# Patient Record
Sex: MALE | Race: WHITE | NOT HISPANIC OR LATINO | Employment: OTHER | URBAN - METROPOLITAN AREA
[De-identification: names, ages, dates, MRNs, and addresses within clinical notes are randomized per-mention and may not be internally consistent; named-entity substitution may affect disease eponyms.]

---

## 2023-04-25 DIAGNOSIS — F32.1 EPISODE OF MODERATE MAJOR DEPRESSION (MULTI): ICD-10-CM

## 2023-04-25 RX ORDER — ESCITALOPRAM OXALATE 20 MG/1
20 TABLET ORAL
COMMUNITY
Start: 2018-06-25 | End: 2023-04-25 | Stop reason: SDUPTHER

## 2023-04-25 RX ORDER — ESCITALOPRAM OXALATE 20 MG/1
TABLET ORAL
Qty: 135 TABLET | Refills: 1 | Status: SHIPPED | OUTPATIENT
Start: 2023-04-25 | End: 2023-10-17

## 2023-07-21 ENCOUNTER — TELEPHONE (OUTPATIENT)
Dept: PRIMARY CARE | Facility: CLINIC | Age: 88
End: 2023-07-21
Payer: MEDICARE

## 2023-07-21 DIAGNOSIS — R25.1 TREMORS OF NERVOUS SYSTEM: Primary | ICD-10-CM

## 2023-12-01 PROBLEM — F32.1 EPISODE OF MODERATE MAJOR DEPRESSION (MULTI): Status: ACTIVE | Noted: 2023-12-01

## 2023-12-01 PROBLEM — G25.0 ESSENTIAL TREMOR: Status: ACTIVE | Noted: 2023-12-01

## 2023-12-01 PROBLEM — E55.9 VITAMIN D DEFICIENCY: Status: ACTIVE | Noted: 2023-12-01

## 2023-12-01 PROBLEM — E53.8 VITAMIN B12 DEFICIENCY: Status: ACTIVE | Noted: 2023-12-01

## 2023-12-01 PROBLEM — H90.3 SENSORINEURAL HEARING LOSS (SNHL) OF BOTH EARS: Status: ACTIVE | Noted: 2023-12-01

## 2023-12-01 PROBLEM — D18.01 HEMANGIOMA OF SKIN AND SUBCUTANEOUS TISSUE: Status: ACTIVE | Noted: 2021-01-05

## 2023-12-01 PROBLEM — B02.23 POST-HERPETIC POLYNEUROPATHY: Status: ACTIVE | Noted: 2023-12-01

## 2023-12-01 PROBLEM — E78.2 COMBINED HYPERLIPIDEMIA: Status: ACTIVE | Noted: 2023-12-01

## 2023-12-01 PROBLEM — G47.33 OBSTRUCTIVE SLEEP APNEA: Status: ACTIVE | Noted: 2023-12-01

## 2023-12-01 PROBLEM — M47.816 ARTHRITIS OF LUMBAR SPINE: Status: ACTIVE | Noted: 2023-12-01

## 2023-12-01 PROBLEM — I45.10 RIGHT BUNDLE BRANCH BLOCK (RBBB) ON ELECTROCARDIOGRAM (ECG): Status: ACTIVE | Noted: 2023-12-01

## 2023-12-01 PROBLEM — E78.5 DYSLIPIDEMIA: Status: ACTIVE | Noted: 2023-12-01

## 2023-12-01 PROBLEM — K21.9 GERD WITHOUT ESOPHAGITIS: Status: ACTIVE | Noted: 2023-12-01

## 2023-12-01 PROBLEM — I10 PRIMARY HYPERTENSION: Status: ACTIVE | Noted: 2023-12-01

## 2023-12-01 PROBLEM — I65.23 BILATERAL CAROTID ARTERY OCCLUSION: Status: ACTIVE | Noted: 2023-12-01

## 2023-12-01 PROBLEM — I87.2 CHRONIC VENOUS INSUFFICIENCY: Status: ACTIVE | Noted: 2021-01-05

## 2023-12-01 PROBLEM — Z95.0 PACEMAKER: Status: ACTIVE | Noted: 2023-12-01

## 2023-12-01 PROBLEM — D22.5 MELANOCYTIC NEVI OF TRUNK: Status: ACTIVE | Noted: 2021-01-05

## 2023-12-01 PROBLEM — I34.0 NONRHEUMATIC MITRAL VALVE REGURGITATION: Status: ACTIVE | Noted: 2023-12-01

## 2023-12-05 ENCOUNTER — OFFICE VISIT (OUTPATIENT)
Dept: NEUROLOGY | Facility: CLINIC | Age: 88
End: 2023-12-05
Payer: MEDICARE

## 2023-12-05 VITALS
BODY MASS INDEX: 32.78 KG/M2 | HEART RATE: 70 BPM | DIASTOLIC BLOOD PRESSURE: 80 MMHG | WEIGHT: 229 LBS | SYSTOLIC BLOOD PRESSURE: 120 MMHG | HEIGHT: 70 IN

## 2023-12-05 DIAGNOSIS — G25.0 ESSENTIAL TREMOR: ICD-10-CM

## 2023-12-05 DIAGNOSIS — R42 DIZZINESS: Primary | ICD-10-CM

## 2023-12-05 PROCEDURE — 1036F TOBACCO NON-USER: CPT | Performed by: PSYCHIATRY & NEUROLOGY

## 2023-12-05 PROCEDURE — 3079F DIAST BP 80-89 MM HG: CPT | Performed by: PSYCHIATRY & NEUROLOGY

## 2023-12-05 PROCEDURE — 3074F SYST BP LT 130 MM HG: CPT | Performed by: PSYCHIATRY & NEUROLOGY

## 2023-12-05 PROCEDURE — 1159F MED LIST DOCD IN RCRD: CPT | Performed by: PSYCHIATRY & NEUROLOGY

## 2023-12-05 PROCEDURE — 99204 OFFICE O/P NEW MOD 45 MIN: CPT | Performed by: PSYCHIATRY & NEUROLOGY

## 2023-12-05 RX ORDER — VITAMIN B COMPLEX
1 TABLET ORAL DAILY
COMMUNITY

## 2023-12-05 RX ORDER — ROSUVASTATIN CALCIUM 10 MG/1
10 TABLET, COATED ORAL DAILY
COMMUNITY

## 2023-12-05 RX ORDER — METOPROLOL SUCCINATE 25 MG/1
12.5 TABLET, EXTENDED RELEASE ORAL DAILY
COMMUNITY
End: 2024-01-12 | Stop reason: ALTCHOICE

## 2023-12-05 RX ORDER — ACETAMINOPHEN 500 MG
2000 TABLET ORAL DAILY
COMMUNITY
Start: 2022-01-27

## 2023-12-05 RX ORDER — B-COMPLEX WITH VITAMIN C
1 TABLET ORAL DAILY
COMMUNITY

## 2023-12-05 RX ORDER — PRIMIDONE 50 MG/1
25 TABLET ORAL NIGHTLY
Qty: 15 TABLET | Refills: 11 | Status: SHIPPED | OUTPATIENT
Start: 2023-12-05 | End: 2024-02-12 | Stop reason: SDUPTHER

## 2023-12-05 RX ORDER — LOPERAMIDE HCL 2 MG
2 TABLET ORAL AS NEEDED
COMMUNITY

## 2023-12-05 RX ORDER — MV-MIN/FOLIC/K1/LYCOPEN/LUTEIN 300-60 MCG
TABLET ORAL DAILY
COMMUNITY
Start: 2022-01-27

## 2023-12-05 RX ORDER — ACETAMINOPHEN 500 MG
500 TABLET ORAL EVERY 6 HOURS PRN
COMMUNITY

## 2023-12-05 RX ORDER — MECLIZINE HYDROCHLORIDE 25 MG/1
25 TABLET ORAL AS NEEDED
COMMUNITY
Start: 2018-01-16 | End: 2024-01-12 | Stop reason: ALTCHOICE

## 2023-12-05 ASSESSMENT — ENCOUNTER SYMPTOMS
DIZZINESS: 1
TREMORS: 1

## 2023-12-05 ASSESSMENT — VISUAL ACUITY: VA_NORMAL: 1

## 2023-12-05 NOTE — PROGRESS NOTES
I saw and evaluated the patient. I personally obtained the key and critical portions of the history and physical exam or was physically present for key and critical portions performed by the resident/fellow. I reviewed the resident/fellow's documentation and discussed the patient with the resident/fellow. I agree with the resident/fellow's medical decision making as documented in the note.    Hi Schwarz MD

## 2024-01-04 ENCOUNTER — TELEPHONE (OUTPATIENT)
Dept: NEUROLOGY | Facility: CLINIC | Age: 89
End: 2024-01-04
Payer: MEDICARE

## 2024-01-04 ENCOUNTER — APPOINTMENT (OUTPATIENT)
Dept: RADIOLOGY | Facility: HOSPITAL | Age: 89
End: 2024-01-04
Payer: MEDICARE

## 2024-01-04 DIAGNOSIS — Z95.9 CARDIAC DEVICE IN SITU: Primary | ICD-10-CM

## 2024-01-11 NOTE — PROGRESS NOTES
"Subjective   Reason for Visit: Garrison Rodriguez is an 89 y.o. male here for a Medicare Wellness visit.     Past Medical, Surgical, and Family History reviewed and updated in chart.    Reviewed all medications by prescribing practitioner or clinical pharmacist (such as prescriptions, OTCs, herbal therapies and supplements) and documented in the medical record.    HPI  Garrison \"Jean Claude\" is an 88 yo male presenting today for AWV     Pt is accompanied by his daughter, Hetal      Pt has hearing loss, wears B/L hearing aids    Pt saw neurology, Dr. Schwarz  Discussed his dizziness, was Rx Primidone 25 mg daily  Stopped metoprolol (okay per cardiology)  Is going to have an MRI of neck, waiting for clearance d/t having pacemaker, had chest xray today      #AFIB/PACEMAKER  Dx'd 9/2021  Cardioversion 2021  Pacemaker 2021  Cardiology: Dr. Aguirre  Rx  Eliquis  BP today: 124/78    #DEPRESSION  Dx'd 2018 (death of spouse)  Rx Lexapro 30 mg daily  mood: no concerns  sleep: no concerns     #GIORGI  sleep study 2019  pt attempted to wear CPAP, but unable to tolerate   states \"I don't want to wear it\"  Family supports not using device     Patient Care Team:  NICOLE Fernandez-CNP as PCP - General (Family Medicine)  John Moritz, MD as PCP - [employer] PCP       Review of Systems  All 13 systems were reviewed and are within normal limits except positive and pertinent negative responses which are noted below or in HPI.      Objective   Vitals:  /78   Pulse 69   Ht 1.778 m (5' 10\")   Wt 104 kg (230 lb)   SpO2 96%   BMI 33.00 kg/m²       Current Outpatient Medications   Medication Instructions    acetaminophen (TYLENOL) 500 mg, oral, Every 6 hours PRN    apixaban (ELIQUIS) 5 mg, oral, 2 times daily    b complex-vitamin c tablet 1 tablet, oral, Daily    cholecalciferol (VITAMIN D-3) 2,000 Units, oral, Daily    cyanocobalamin, vitamin B-12, (Vitamin B-12) 2,500 mcg tablet, sublingual SL tablet 1 tablet, oral, Daily    " escitalopram (Lexapro) 20 mg tablet TAKE 1 & 1/2 TABLETS BY MOUTH EVERY DAY    loperamide (IMODIUM A-D) 2 mg, oral, As needed    mv-min-folic-K1-lycopen-lutein (Centrum Silver Men) 642--300 mcg tablet oral, Daily    primidone (MYSOLINE) 25 mg, oral, Nightly    rosuvastatin (CRESTOR) 10 mg, oral, Daily         Physical Exam  Vitals reviewed.   Cardiovascular:      Rate and Rhythm: Normal rate.      Pulses: Normal pulses.      Heart sounds: Normal heart sounds.   Pulmonary:      Effort: Pulmonary effort is normal.      Breath sounds: Normal breath sounds.   Skin:     General: Skin is warm and dry.   Neurological:      Mental Status: He is alert.   Psychiatric:         Mood and Affect: Mood normal.         Assessment/Plan     Problem List Items Addressed This Visit             ICD-10-CM    Episode of moderate major depression (CMS/HCC) F32.1    Pacemaker Z95.0    Vitamin B12 deficiency E53.8    Relevant Orders    Vitamin B12    Vitamin D deficiency E55.9    Relevant Orders    Vitamin D 25-Hydroxy,Total (for eval of Vitamin D levels)    Primary hypertension I10    Relevant Orders    Comprehensive Metabolic Panel    Dyslipidemia E78.5    Relevant Orders    Lipid Panel     Other Visit Diagnoses         Codes    Routine general medical examination at health care facility    -  Primary Z00.00    Medicare annual wellness visit, subsequent     Z00.00

## 2024-01-12 ENCOUNTER — OFFICE VISIT (OUTPATIENT)
Dept: PRIMARY CARE | Facility: CLINIC | Age: 89
End: 2024-01-12
Payer: MEDICARE

## 2024-01-12 ENCOUNTER — ANCILLARY PROCEDURE (OUTPATIENT)
Dept: RADIOLOGY | Facility: CLINIC | Age: 89
End: 2024-01-12
Payer: MEDICARE

## 2024-01-12 VITALS
OXYGEN SATURATION: 96 % | SYSTOLIC BLOOD PRESSURE: 124 MMHG | HEIGHT: 70 IN | BODY MASS INDEX: 32.93 KG/M2 | HEART RATE: 69 BPM | DIASTOLIC BLOOD PRESSURE: 78 MMHG | WEIGHT: 230 LBS

## 2024-01-12 DIAGNOSIS — E53.8 VITAMIN B12 DEFICIENCY: ICD-10-CM

## 2024-01-12 DIAGNOSIS — E78.5 DYSLIPIDEMIA: ICD-10-CM

## 2024-01-12 DIAGNOSIS — F32.1 EPISODE OF MODERATE MAJOR DEPRESSION (MULTI): ICD-10-CM

## 2024-01-12 DIAGNOSIS — Z00.00 MEDICARE ANNUAL WELLNESS VISIT, SUBSEQUENT: Primary | ICD-10-CM

## 2024-01-12 DIAGNOSIS — E55.9 VITAMIN D DEFICIENCY: ICD-10-CM

## 2024-01-12 DIAGNOSIS — Z95.9 CARDIAC DEVICE IN SITU: ICD-10-CM

## 2024-01-12 DIAGNOSIS — Z00.00 ROUTINE GENERAL MEDICAL EXAMINATION AT HEALTH CARE FACILITY: ICD-10-CM

## 2024-01-12 DIAGNOSIS — I10 PRIMARY HYPERTENSION: ICD-10-CM

## 2024-01-12 DIAGNOSIS — Z95.0 PACEMAKER: ICD-10-CM

## 2024-01-12 PROCEDURE — 1159F MED LIST DOCD IN RCRD: CPT | Performed by: NURSE PRACTITIONER

## 2024-01-12 PROCEDURE — 1170F FXNL STATUS ASSESSED: CPT | Performed by: NURSE PRACTITIONER

## 2024-01-12 PROCEDURE — 71046 X-RAY EXAM CHEST 2 VIEWS: CPT | Performed by: RADIOLOGY

## 2024-01-12 PROCEDURE — 3074F SYST BP LT 130 MM HG: CPT | Performed by: NURSE PRACTITIONER

## 2024-01-12 PROCEDURE — G0439 PPPS, SUBSEQ VISIT: HCPCS | Performed by: NURSE PRACTITIONER

## 2024-01-12 PROCEDURE — 99212 OFFICE O/P EST SF 10 MIN: CPT | Performed by: NURSE PRACTITIONER

## 2024-01-12 PROCEDURE — 3078F DIAST BP <80 MM HG: CPT | Performed by: NURSE PRACTITIONER

## 2024-01-12 PROCEDURE — 71046 X-RAY EXAM CHEST 2 VIEWS: CPT

## 2024-01-12 PROCEDURE — 1036F TOBACCO NON-USER: CPT | Performed by: NURSE PRACTITIONER

## 2024-01-12 ASSESSMENT — PATIENT HEALTH QUESTIONNAIRE - PHQ9
SUM OF ALL RESPONSES TO PHQ9 QUESTIONS 1 AND 2: 0
2. FEELING DOWN, DEPRESSED OR HOPELESS: NOT AT ALL
1. LITTLE INTEREST OR PLEASURE IN DOING THINGS: NOT AT ALL

## 2024-01-12 ASSESSMENT — ACTIVITIES OF DAILY LIVING (ADL)
DRESSING: INDEPENDENT
DOING_HOUSEWORK: INDEPENDENT
TAKING_MEDICATION: NEEDS ASSISTANCE
GROCERY_SHOPPING: INDEPENDENT
MANAGING_FINANCES: INDEPENDENT
BATHING: INDEPENDENT

## 2024-01-12 ASSESSMENT — ENCOUNTER SYMPTOMS
LOSS OF SENSATION IN FEET: 0
DEPRESSION: 0
OCCASIONAL FEELINGS OF UNSTEADINESS: 1

## 2024-01-12 NOTE — PATIENT INSTRUCTIONS
Thank you for seeing me today.  It was a pleasure to see you again!    Today we did your Annual Medicare Wellness Exam and discussed the following:     Continue medications as prescribed    F/U with neurology as encouraged    F/U with cardiology as encouraged     Lab work ordered today.  Please have your blood drawn in the next 1-2 weeks.  You need to be FASTING for 12 hours prior to blood draw.  You may only have water.  Please contact your insurance company to ask about the best location to get blood drawn.  We will contact you with the results of your blood work and any necessary adjustments  to your plan of care, if you do not hear from us within 3-5 days of having your blood drawn, please call the office at [phone].      RTC JANUARY 2025 FOR AWV AND AS NEEDED

## 2024-01-18 ENCOUNTER — LAB (OUTPATIENT)
Dept: LAB | Facility: LAB | Age: 89
End: 2024-01-18
Payer: MEDICARE

## 2024-01-18 DIAGNOSIS — E55.9 VITAMIN D DEFICIENCY: ICD-10-CM

## 2024-01-18 DIAGNOSIS — I10 PRIMARY HYPERTENSION: ICD-10-CM

## 2024-01-18 DIAGNOSIS — E78.5 DYSLIPIDEMIA: ICD-10-CM

## 2024-01-18 DIAGNOSIS — E53.8 VITAMIN B12 DEFICIENCY: ICD-10-CM

## 2024-01-18 LAB
25(OH)D3 SERPL-MCNC: 65 NG/ML (ref 30–100)
ALBUMIN SERPL BCP-MCNC: 4 G/DL (ref 3.4–5)
ALP SERPL-CCNC: 78 U/L (ref 33–136)
ALT SERPL W P-5'-P-CCNC: 16 U/L (ref 10–52)
ANION GAP SERPL CALC-SCNC: 14 MMOL/L (ref 10–20)
AST SERPL W P-5'-P-CCNC: 26 U/L (ref 9–39)
BILIRUB SERPL-MCNC: 0.6 MG/DL (ref 0–1.2)
BUN SERPL-MCNC: 25 MG/DL (ref 6–23)
CALCIUM SERPL-MCNC: 9.3 MG/DL (ref 8.6–10.3)
CHLORIDE SERPL-SCNC: 102 MMOL/L (ref 98–107)
CHOLEST SERPL-MCNC: 144 MG/DL (ref 0–199)
CHOLESTEROL/HDL RATIO: 2.6
CO2 SERPL-SCNC: 27 MMOL/L (ref 21–32)
CREAT SERPL-MCNC: 1.23 MG/DL (ref 0.5–1.3)
EGFRCR SERPLBLD CKD-EPI 2021: 56 ML/MIN/1.73M*2
GLUCOSE SERPL-MCNC: 83 MG/DL (ref 74–99)
HDLC SERPL-MCNC: 56.3 MG/DL
LDLC SERPL CALC-MCNC: 75 MG/DL
NON HDL CHOLESTEROL: 88 MG/DL (ref 0–149)
POTASSIUM SERPL-SCNC: 4.5 MMOL/L (ref 3.5–5.3)
PROT SERPL-MCNC: 6.9 G/DL (ref 6.4–8.2)
SODIUM SERPL-SCNC: 138 MMOL/L (ref 136–145)
TRIGL SERPL-MCNC: 62 MG/DL (ref 0–149)
VIT B12 SERPL-MCNC: 571 PG/ML (ref 211–911)
VLDL: 12 MG/DL (ref 0–40)

## 2024-01-18 PROCEDURE — 82306 VITAMIN D 25 HYDROXY: CPT

## 2024-01-18 PROCEDURE — 36415 COLL VENOUS BLD VENIPUNCTURE: CPT

## 2024-01-18 PROCEDURE — 82607 VITAMIN B-12: CPT

## 2024-01-18 PROCEDURE — 80061 LIPID PANEL: CPT

## 2024-01-18 PROCEDURE — 80053 COMPREHEN METABOLIC PANEL: CPT

## 2024-01-31 ENCOUNTER — HOSPITAL ENCOUNTER (OUTPATIENT)
Dept: RADIOLOGY | Facility: HOSPITAL | Age: 89
Discharge: HOME | End: 2024-01-31
Payer: MEDICARE

## 2024-01-31 VITALS — HEART RATE: 80 BPM | OXYGEN SATURATION: 94 %

## 2024-01-31 DIAGNOSIS — R42 DIZZINESS: ICD-10-CM

## 2024-01-31 PROCEDURE — 72141 MRI NECK SPINE W/O DYE: CPT | Performed by: RADIOLOGY

## 2024-01-31 PROCEDURE — 72141 MRI NECK SPINE W/O DYE: CPT

## 2024-02-04 DIAGNOSIS — F32.1 EPISODE OF MODERATE MAJOR DEPRESSION (MULTI): ICD-10-CM

## 2024-02-05 RX ORDER — ESCITALOPRAM OXALATE 20 MG/1
TABLET ORAL
Qty: 135 TABLET | Refills: 3 | Status: SHIPPED | OUTPATIENT
Start: 2024-02-05

## 2024-02-12 ENCOUNTER — OFFICE VISIT (OUTPATIENT)
Dept: NEUROLOGY | Facility: CLINIC | Age: 89
End: 2024-02-12
Payer: MEDICARE

## 2024-02-12 VITALS
DIASTOLIC BLOOD PRESSURE: 84 MMHG | HEART RATE: 64 BPM | HEIGHT: 70 IN | WEIGHT: 230 LBS | SYSTOLIC BLOOD PRESSURE: 126 MMHG | BODY MASS INDEX: 32.93 KG/M2

## 2024-02-12 DIAGNOSIS — G25.0 ESSENTIAL TREMOR: ICD-10-CM

## 2024-02-12 PROCEDURE — 1157F ADVNC CARE PLAN IN RCRD: CPT | Performed by: PSYCHIATRY & NEUROLOGY

## 2024-02-12 PROCEDURE — 1160F RVW MEDS BY RX/DR IN RCRD: CPT | Performed by: PSYCHIATRY & NEUROLOGY

## 2024-02-12 PROCEDURE — 1123F ACP DISCUSS/DSCN MKR DOCD: CPT | Performed by: PSYCHIATRY & NEUROLOGY

## 2024-02-12 PROCEDURE — 3079F DIAST BP 80-89 MM HG: CPT | Performed by: PSYCHIATRY & NEUROLOGY

## 2024-02-12 PROCEDURE — 99214 OFFICE O/P EST MOD 30 MIN: CPT | Performed by: PSYCHIATRY & NEUROLOGY

## 2024-02-12 PROCEDURE — 3074F SYST BP LT 130 MM HG: CPT | Performed by: PSYCHIATRY & NEUROLOGY

## 2024-02-12 PROCEDURE — 1036F TOBACCO NON-USER: CPT | Performed by: PSYCHIATRY & NEUROLOGY

## 2024-02-12 PROCEDURE — 1159F MED LIST DOCD IN RCRD: CPT | Performed by: PSYCHIATRY & NEUROLOGY

## 2024-02-12 RX ORDER — PRIMIDONE 50 MG/1
50 TABLET ORAL NIGHTLY
Qty: 90 TABLET | Refills: 3 | Status: SHIPPED | OUTPATIENT
Start: 2024-02-12 | End: 2025-02-11

## 2024-02-12 ASSESSMENT — ENCOUNTER SYMPTOMS
TREMORS: 1
COUGH: 0
AGITATION: 0
SHORTNESS OF BREATH: 0
BACK PAIN: 0
POLYDIPSIA: 0
ARTHRALGIAS: 1
WOUND: 0
CONSTIPATION: 0
DIFFICULTY URINATING: 0
FATIGUE: 0
CHILLS: 0
DIARRHEA: 0
EYE DISCHARGE: 0
DIAPHORESIS: 0
FEVER: 0
ADENOPATHY: 0

## 2024-02-12 ASSESSMENT — VISUAL ACUITY: VA_NORMAL: 1

## 2024-02-12 NOTE — PROGRESS NOTES
"Follow up : Essential tremors    History Of Present Illness  Garrison Rodriguez \"Porfirio" is a 89 y.o. male presenting with Essential tremors. He is on half a pill and doing well and no side effects of the primidone. He still driving and left side hand tremors are worse than the right.  MRI C images shows and and discussed. See report below.    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    Post-herpetic polyneuropathy    Spondylosis, unspecified 10/12/2016    Degenerative arthritis of spine          Past Surgical History:   Procedure Laterality Date    BREAST LUMPECTOMY  07/15/2016    Left Breast Lumpectomy    OTHER SURGICAL HISTORY  05/18/2022    Gallbladder surgery    OTHER SURGICAL HISTORY  05/18/2022    Heart surgery    OTHER SURGICAL HISTORY  05/18/2022    Back surgery    OTHER SURGICAL HISTORY  04/15/2020    Cholecystectomy with cholangiography    OTHER SURGICAL HISTORY  04/15/2020    Esophagogastroduodenoscopy    TONSILLECTOMY  06/25/2018    Tonsillectomy    TOTAL KNEE ARTHROPLASTY  01/04/2023    Total Knee Arthroplasty        Social History  Social History     Tobacco Use    Smoking status: Never     Passive exposure: Never    Smokeless tobacco: Never   Vaping Use    Vaping Use: Never used   Substance Use Topics    Alcohol use: Never    Drug use: Never     Allergies  Patient has no known allergies.  (Not in a hospital admission)      Review of Systems   Constitutional:  Negative for chills, diaphoresis, fatigue and fever.   HENT:  Negative for congestion.    Eyes:  Negative for discharge.   Respiratory:  Negative " for cough and shortness of breath.    Cardiovascular:  Negative for chest pain.   Gastrointestinal:  Negative for constipation and diarrhea.   Endocrine: Negative for polydipsia.   Genitourinary:  Negative for difficulty urinating.   Musculoskeletal:  Positive for arthralgias. Negative for back pain.        S/p Knee surgery, bilateral   Skin:  Negative for pallor and wound.   Allergic/Immunologic: Negative for environmental allergies.   Neurological:  Positive for tremors.   Hematological:  Negative for adenopathy.   Psychiatric/Behavioral:  Negative for agitation and behavioral problems.          Cardiovascular system: S1-S2 intact  Respiratory clear to auscultation bilateral on deep breathing he feels irritability on the left side of the chest.    Neurological Exam  Mental Status  Awake, alert and oriented to person, place and time. Recent and remote memory are intact. Able to copy figure. Clock drawing is normal. Speech is normal. Able to name objects, name parts of objects, repeat, read and write. Follows three-step commands. Able to perform serial calculations. Able to spell words backwards. Fund of knowledge is appropriate for level of education.    Cranial Nerves  CN II: Visual acuity is normal. Right funduscopic exam: disc intact. Left funduscopic exam: disc intact.  CN III, IV, VI: Extraocular movements intact bilaterally. Normal lids and orbits bilaterally.   Right pupil: Round. Reactive to light. Reactive to accommodation.  CN V:  Right: Facial sensation is normal.  Left: Facial sensation is normal on the left.  CN VII: Full and symmetric facial movement.  CN VIII:  Right: Hearing is normal.  Left: Hearing is normal.  CN IX, X:  Right: Palate is normal.  Left: Palate is normal.  CN XI:  Right: Sternocleidomastoid strength is normal.  Left: Sternocleidomastoid strength is normal.  CN XII: Tongue midline without atrophy or fasciculations.    Motor  Normal muscle bulk throughout. No fasciculations present.  "Normal muscle tone. No abnormal involuntary movements. Strength is 5/5 throughout all four extremities.    Sensory  Light touch is normal in upper and lower extremities. Pinprick is normal in upper and lower extremities. Temperature is normal in upper and lower extremities. Vibration is normal in upper and lower extremities.     Reflexes  Deep tendon reflexes are 2+ and symmetric except as noted.                                            Right                      Left  Hamstring                            0                         0  Achilles                                0                         0  Jaw jerk absent.  Right pathological reflexes: Rusty's absent.  Left pathological reflexes: Rusty's absent.  Glabellar tap absent. Snout absent. Right palmomental absent. Left palmomental absent. Right palmar grasp absent. Left palmar grasp absent.    Coordination  Right: Finger-to-nose normal. Rapid alternating movement normal. Heel-to-shin normal.Left: Finger-to-nose normal. Rapid alternating movement normal. Heel-to-shin normal.    Gait  Normal casual, toe, heel and tandem gait.        Last Recorded Vitals  Blood pressure 126/84, pulse 64, height 1.778 m (5' 10\"), weight 104 kg (230 lb).    Relevant Results      Current Outpatient Medications:     acetaminophen (Tylenol) 500 mg tablet, Take 1 tablet (500 mg) by mouth every 6 hours if needed., Disp: , Rfl:     apixaban (Eliquis) 5 mg tablet, Take 1 tablet (5 mg) by mouth 2 times a day., Disp: , Rfl:     b complex-vitamin c tablet, Take 1 tablet by mouth once daily., Disp: , Rfl:     cholecalciferol (Vitamin D-3) 50 mcg (2,000 unit) capsule, Take 1 capsule (50 mcg) by mouth early in the morning.., Disp: , Rfl:     cyanocobalamin, vitamin B-12, (Vitamin B-12) 2,500 mcg tablet, sublingual SL tablet, Take 1 tablet (2,500 mcg) by mouth once daily., Disp: , Rfl:     escitalopram (Lexapro) 20 mg tablet, TAKE 1 & 1/2 TABLETS BY MOUTH EVERY DAY, Disp: 135 tablet, Rfl: " 3    loperamide (Imodium A-D) 2 mg tablet, Take 1 tablet (2 mg) by mouth if needed., Disp: , Rfl:     mv-min-folic-K1-lycopen-lutein (Centrum Silver Men) 252--300 mcg tablet, Take by mouth once daily., Disp: , Rfl:     rosuvastatin (Crestor) 10 mg tablet, Take 1 tablet (10 mg) by mouth once daily., Disp: , Rfl:     primidone (Mysoline) 50 mg tablet, Take 1 tablet (50 mg) by mouth once daily at bedtime., Disp: 90 tablet, Rfl: 3     Continuous medications    PRN medications, reviewed                                        I have personally reviewed the following imaging for brain (CT/ MR) and results and discussed in detail with the patient/care giver/family     MR cervical spine wo IV contrast    Result Date: 1/31/2024  Interpreted By:  Roc Bourne, STUDY: MR CERVICAL SPINE WO IV CONTRAST;  1/31/2024 3:00 pm   INDICATION: Signs/Symptoms:dizziness, limited cervical movement.   COMPARISON: None.   ACCESSION NUMBER(S): [id number]   ORDERING CLINICIAN: CHERIE MICEHLLE   TECHNIQUE: Sagittal STIR, sagittal T2, sagittal T1, axial T2, axial T1 weighted MRI images of the cervical spine were obtained.   FINDINGS: There is exaggeration of the lordotic curvature of the cervical spine which may be related to patient positioning.   There is 1-2 mm of anterolisthesis of C4 on C5.   No focal bone marrow signal abnormality is noted.   The visualized spinal cord demonstrates no signal abnormality within it.   At the C2/3 level,  there is a mild posterior disc/osteophyte complex and ligamentum flavum hypertrophy contributing to mild spinal canal narrowing without significant spinal cord deformity. There are degenerative facet changes left greater than right contributing to moderate left-sided neural foraminal narrowing there is no significant right-sided neural foraminal narrowing.   At the C3/4 level,  there is a mild posterior disc osteophyte complex and ligamentum flavum hypertrophy contributing to mild spinal canal  narrowing without spinal cord deformity. There are hypertrophic degenerative facet changes and degenerative uncovertebral joint changes left greater than right contributing to severe left and mild to moderate right-sided neural foraminal narrowing.   At the C4/5 level,  there is 1-2 mm of anterolisthesis of C4 on C5 as well as a mild posterior disc/osteophyte complex contributing to mild encroachment upon the spinal canal without spinal cord deformity. There are hypertrophic degenerative facet changes left greater than right there is moderate encroachment upon the neural foramen bilaterally.   At the C5/6 level,  there is a posterior disc/osteophyte complex and ligamentum flavum hypertrophy contributing to mild spinal canal narrowing without spinal cord deformity. There are degenerative uncovertebral joint changes and degenerative facet changes contributing to moderate encroachment upon the neural foramen bilaterally.   At the C6/7 level,  there is a posterior disc/osteophyte complex contributing to effacement of the ventral subarachnoid space without significant spinal cord deformity. There is mild overall spinal canal narrowing. There are degenerative uncovertebral joint changes and degenerative facet changes contributing to moderate right and mild left-sided neural foraminal narrowing.   At the C7/T1 level,  there is a mild posterior disc/osteophyte complex more pronounced to the left of midline along with mild ligamentum flavum hypertrophy contributing to mild spinal canal narrowing. There is mild-to-moderate left and mild right-sided neural foraminal narrowing.   At the T1/2 level,  there is mild ligamentum flavum hypertrophy without significant spinal canal narrowing. There are mild degenerative facet changes contributing to mild encroachment upon the right neural foramen there is no significant left-sided neural foraminal narrowing.       There is exaggeration of the lordotic curvature of the cervical spine  which may be related to patient positioning.   There is 1-2 mm of anterolisthesis of C4 on C5.   There is multilevel spondylosis with varying degrees of spinal canal and neural foraminal narrowing as described above.   MACRO: None.   Signed by: Roc Bourne 1/31/2024 4:10 PM Dictation workstation:   [id number]       Imaging Brain/Head  No results found for this or any previous visit.      Imaging Cervical  Interpreted By:  Roc Bourne,  STUDY:  MR CERVICAL SPINE WO IV CONTRAST;  1/31/2024 3:00 pm    INDICATION:  Signs/Symptoms:dizziness, limited cervical movement.    COMPARISON:  None.    ACCESSION NUMBER(S):  [id number]    ORDERING CLINICIAN:  CHERIE MICHELLE    TECHNIQUE:  Sagittal STIR, sagittal T2, sagittal T1, axial T2, axial T1 weighted  MRI images of the cervical spine were obtained.    FINDINGS:  There is exaggeration of the lordotic curvature of the cervical spine  which may be related to patient positioning.    There is 1-2 mm of anterolisthesis of C4 on C5.    No focal bone marrow signal abnormality is noted.    The visualized spinal cord demonstrates no signal abnormality within  it.    At the C2/3 level,  there is a mild posterior disc/osteophyte complex  and ligamentum flavum hypertrophy contributing to mild spinal canal  narrowing without significant spinal cord deformity. There are  degenerative facet changes left greater than right contributing to  moderate left-sided neural foraminal narrowing there is no  significant right-sided neural foraminal narrowing.    At the C3/4 level,  there is a mild posterior disc osteophyte complex  and ligamentum flavum hypertrophy contributing to mild spinal canal  narrowing without spinal cord deformity. There are hypertrophic  degenerative facet changes and degenerative uncovertebral joint  changes left greater than right contributing to severe left and mild  to moderate right-sided neural foraminal narrowing.    At the C4/5 level,  there is 1-2 mm of  anterolisthesis of C4 on C5 as  well as a mild posterior disc/osteophyte complex contributing to mild  encroachment upon the spinal canal without spinal cord deformity.  There are hypertrophic degenerative facet changes left greater than  right there is moderate encroachment upon the neural foramen  bilaterally.    At the C5/6 level,  there is a posterior disc/osteophyte complex and  ligamentum flavum hypertrophy contributing to mild spinal canal  narrowing without spinal cord deformity. There are degenerative  uncovertebral joint changes and degenerative facet changes  contributing to moderate encroachment upon the neural foramen  bilaterally.    At the C6/7 level,  there is a posterior disc/osteophyte complex  contributing to effacement of the ventral subarachnoid space without  significant spinal cord deformity. There is mild overall spinal canal  narrowing. There are degenerative uncovertebral joint changes and  degenerative facet changes contributing to moderate right and mild  left-sided neural foraminal narrowing.    At the C7/T1 level,  there is a mild posterior disc/osteophyte  complex more pronounced to the left of midline along with mild  ligamentum flavum hypertrophy contributing to mild spinal canal  narrowing. There is mild-to-moderate left and mild right-sided neural  foraminal narrowing.    At the T1/2 level,  there is mild ligamentum flavum hypertrophy  without significant spinal canal narrowing. There are mild  degenerative facet changes contributing to mild encroachment upon the  right neural foramen there is no significant left-sided neural  foraminal narrowing.  There is exaggeration of the lordotic curvature of the cervical spine  which may be related to patient positioning.    There is 1-2 mm of anterolisthesis of C4 on C5.    There is multilevel spondylosis with varying degrees of spinal canal  and neural foraminal narrowing as described above.    MACRO:  None.    Signed by: Roc Bourne  1/31/2024 4:10 PM  Dictation workstation:   [id number]      Assessment/Plan   Essential tremors  Primidone is increased to 50 mg q hs    He walks a bit wobbly and it is probably related to knee surgeries    PT for the poor gait and balance.     Patient/Family Education: Extensive time was spent educating the patient on relevant anatomy, clinical findings and imaging, as well as discussing the potential diagnoses as discussed above.  Pharmacology: as above. Exercise: I discussed the importance of maintaining a daily exercise program, including stretching and strengthening. Preventative strategies were reviewed, specifically avoidance of any exercises that exacerbate pain.Return to online virtual visit/ clinic visit for follow-up with EVE Centeno in 12 months  or sooner as needed.The patient expressed understanding and agreement with the assessment and plan.  Patient encouraged to contact us should they have any questions, concerns, or any changes in symptoms. Thank you for allowing me to participate in the care of your patient.** This note is created using speech recognition transcription software. Despite proofreading, several typographical errors might be present that might affect the meaning of the content. Please call with any questions.**          Hi Schwarz MD

## 2024-05-10 ENCOUNTER — APPOINTMENT (OUTPATIENT)
Dept: RADIOLOGY | Facility: HOSPITAL | Age: 89
End: 2024-05-10
Payer: MEDICARE

## 2024-05-10 ENCOUNTER — HOSPITAL ENCOUNTER (EMERGENCY)
Facility: HOSPITAL | Age: 89
Discharge: HOME | End: 2024-05-10
Attending: STUDENT IN AN ORGANIZED HEALTH CARE EDUCATION/TRAINING PROGRAM
Payer: MEDICARE

## 2024-05-10 VITALS
TEMPERATURE: 97.5 F | RESPIRATION RATE: 17 BRPM | BODY MASS INDEX: 32.93 KG/M2 | DIASTOLIC BLOOD PRESSURE: 80 MMHG | WEIGHT: 230 LBS | HEIGHT: 70 IN | OXYGEN SATURATION: 95 % | HEART RATE: 59 BPM | SYSTOLIC BLOOD PRESSURE: 127 MMHG

## 2024-05-10 DIAGNOSIS — S02.2XXA CLOSED FRACTURE OF NASAL BONE, INITIAL ENCOUNTER: ICD-10-CM
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
## 2024-05-10 PROCEDURE — 99285 EMERGENCY DEPT VISIT HI MDM: CPT | Mod: 25

## 2024-05-10 PROCEDURE — 70450 CT HEAD/BRAIN W/O DYE: CPT

## 2024-05-10 PROCEDURE — 70486 CT MAXILLOFACIAL W/O DYE: CPT | Performed by: RADIOLOGY

## 2024-05-10 PROCEDURE — 2500000001 HC RX 250 WO HCPCS SELF ADMINISTERED DRUGS (ALT 637 FOR MEDICARE OP): Performed by: STUDENT IN AN ORGANIZED HEALTH CARE EDUCATION/TRAINING PROGRAM

## 2024-05-10 PROCEDURE — 76377 3D RENDER W/INTRP POSTPROCES: CPT

## 2024-05-10 PROCEDURE — 76376 3D RENDER W/INTRP POSTPROCES: CPT | Performed by: RADIOLOGY

## 2024-05-10 PROCEDURE — 70450 CT HEAD/BRAIN W/O DYE: CPT | Performed by: RADIOLOGY

## 2024-05-10 PROCEDURE — 70486 CT MAXILLOFACIAL W/O DYE: CPT

## 2024-05-10 RX ORDER — OXYMETAZOLINE HCL 0.05 %
2 SPRAY, NON-AEROSOL (ML) NASAL ONCE
Status: COMPLETED | OUTPATIENT
Start: 2024-05-10 | End: 2024-05-10

## 2024-05-10 RX ADMIN — OXYMETAZOLINE HYDROCHLORIDE 2 SPRAY: 0.05 SPRAY NASAL at 14:34

## 2024-05-10 ASSESSMENT — LIFESTYLE VARIABLES
HAVE YOU EVER FELT YOU SHOULD CUT DOWN ON YOUR DRINKING: NO
EVER FELT BAD OR GUILTY ABOUT YOUR DRINKING: NO
TOTAL SCORE: 0
HAVE PEOPLE ANNOYED YOU BY CRITICIZING YOUR DRINKING: NO
EVER HAD A DRINK FIRST THING IN THE MORNING TO STEADY YOUR NERVES TO GET RID OF A HANGOVER: NO

## 2024-05-10 ASSESSMENT — COLUMBIA-SUICIDE SEVERITY RATING SCALE - C-SSRS
6. HAVE YOU EVER DONE ANYTHING, STARTED TO DO ANYTHING, OR PREPARED TO DO ANYTHING TO END YOUR LIFE?: NO
2. HAVE YOU ACTUALLY HAD ANY THOUGHTS OF KILLING YOURSELF?: NO
1. IN THE PAST MONTH, HAVE YOU WISHED YOU WERE DEAD OR WISHED YOU COULD GO TO SLEEP AND NOT WAKE UP?: NO

## 2024-05-10 ASSESSMENT — PAIN SCALES - GENERAL: PAINLEVEL_OUTOF10: 2

## 2024-05-10 ASSESSMENT — PAIN - FUNCTIONAL ASSESSMENT: PAIN_FUNCTIONAL_ASSESSMENT: 0-10

## 2024-05-10 ASSESSMENT — PAIN DESCRIPTION - LOCATION: LOCATION: NOSE

## 2024-05-10 NOTE — ED TRIAGE NOTES
Pt fell landing on his face at approx 1030 am after tripping over a sandbox. Pt is on Eliquis but did not take it today. Pt states he had a bloody right nostril this morning but it is no longer bleeding.

## 2024-05-10 NOTE — ED PROVIDER NOTES
CC: Fall (HIA)     HPI:  Patient is an 89-year-old male on Eliquis for atrial fibrillation who presents to the emergency department for a fall.  He hit his head.  Patient states that I he tripped over his foot and landed on the corner of the sandbox.  He hit his nose and then started having a nosebleed.  He denies chest pain, lightheadedness, dizziness or shortness of breath prior to falling.  Nosebleed stopped prior to arrival.  He does have pain to the right lateral nares.    Records Reviewed:  Recent available ED and inpatient notes reviewed in EMR.    PMHx/PSHx:  Per HPI.   - has a past medical history of Benign neoplasm of left breast, Encounter for screening for malignant neoplasm of colon, Personal history of diseases of the skin and subcutaneous tissue, Personal history of other specified conditions, Postherpetic polyneuropathy, and Spondylosis, unspecified.  - has a past surgical history that includes Breast lumpectomy (07/15/2016); Other surgical history (05/18/2022); Other surgical history (05/18/2022); Other surgical history (05/18/2022); Other surgical history (04/15/2020); Other surgical history (04/15/2020); Total knee arthroplasty (01/04/2023); and Tonsillectomy (06/25/2018).  - has Arthritis of lumbar spine; Chronic venous insufficiency; Combined hyperlipidemia; Episode of moderate major depression (Multi); Essential tremor; GERD without esophagitis; Hemangioma of skin and subcutaneous tissue; Melanocytic nevi of trunk; Obstructive sleep apnea; Pacemaker; Post-herpetic polyneuropathy; Right bundle branch block (RBBB) on electrocardiogram (ECG); Sensorineural hearing loss (SNHL) of both ears; Vitamin B12 deficiency; Vitamin D deficiency; Primary hypertension; Bilateral carotid artery occlusion; Nonrheumatic mitral valve regurgitation; and Dyslipidemia on their problem list.    Medications:  Reviewed in EMR. See EMR for complete list of medications and doses.    Allergies:  Patient has no known  allergies.    Social History:  - Tobacco:  reports that he has never smoked. He has never been exposed to tobacco smoke. He has never used smokeless tobacco.   - Alcohol:  reports no history of alcohol use.   - Illicit Drugs:  reports no history of drug use.     ROS:  Per HPI.       ???????????????????????????????????????????????????????????????  Triage Vitals:  T 36.4 °C (97.5 °F)  HR 75  /81  RR 17  O2 97 % None (Room air)    Physical Exam  ???????????????????????????????????????????????????????????????  GEN: well appearing, no acute distress  HEAD: Pain and swelling to palpation over right lateral bridge of the nose, no skin laceration  EYES: PERRL, EOMI, no scleral icterus  ENT: No nasal septal hematoma  NECK: no C-spine tenderness  CVS/CHEST: reg rate, nl rhythm  PULM: CTA b/l no wheezes, crackles, or rhonchi   GI: soft, NT/ND, no rebound or guarding   BACK: no vertebral point tenderness  EXT: no LE edema, 2+ periph pulses in bilat radial and DP   NEURO: Awake and alert, Strength and sensation is equal in b/l upper and lower extremities, normal ambulation, no focal sensory deficits  SKIN: warm, dry  PSYCH: AAOx3 answers questions appropriately    Assessment and Plan:  Patient is an 89-year-old male on anticoagulation who had a mechanical fall.  He hit his head.  He has a nondisplaced nasal bone fracture and was given sinus precautions.  No intracranial hemorrhage.  Discussed tricked return precautions if he develops severe or headache or vomiting.  Patient and family at bedside expressed understanding and agreeable with the plan.    ED Course:  Diagnoses as of 05/10/24 1619   Fall, initial encounter   Closed fracture of nasal bone, initial encounter       Social Determinants Limiting Care:  None identified    Disposition:  Discharged in stable condition with return precautions    Mary Fried, DO      Procedures ? Smart[employer] last updated 5/10/2024 12:45 PM        Mary Fried, DO  05/10/24  [id number]

## 2024-05-10 NOTE — DISCHARGE INSTRUCTIONS
Sleep with your head of bed at 30 degrees do not blow your nose use Afrin as needed x 3 days for congestion

## 2024-09-30 ENCOUNTER — HOSPITAL ENCOUNTER (EMERGENCY)
Facility: HOSPITAL | Age: 89
Discharge: HOME | DRG: 291 | End: 2024-09-30
Attending: EMERGENCY MEDICINE
Payer: MEDICARE

## 2024-09-30 ENCOUNTER — APPOINTMENT (OUTPATIENT)
Dept: RADIOLOGY | Facility: HOSPITAL | Age: 89
DRG: 291 | End: 2024-09-30
Payer: MEDICARE

## 2024-09-30 VITALS
OXYGEN SATURATION: 96 % | HEIGHT: 70 IN | BODY MASS INDEX: 30.06 KG/M2 | WEIGHT: 210 LBS | DIASTOLIC BLOOD PRESSURE: 63 MMHG | HEART RATE: 65 BPM | SYSTOLIC BLOOD PRESSURE: 110 MMHG | RESPIRATION RATE: 18 BRPM | TEMPERATURE: 97.2 F

## 2024-09-30 DIAGNOSIS — N39.0 LOWER URINARY TRACT INFECTIOUS DISEASE: Primary | ICD-10-CM

## 2024-09-30 DIAGNOSIS — R10.32 LEFT LOWER QUADRANT ABDOMINAL PAIN: ICD-10-CM

## 2024-09-30 LAB
ALBUMIN SERPL BCP-MCNC: 3.9 G/DL (ref 3.4–5)
ALP SERPL-CCNC: 71 U/L (ref 33–136)
ALT SERPL W P-5'-P-CCNC: 10 U/L (ref 10–52)
ANION GAP SERPL CALC-SCNC: 12 MMOL/L (ref 10–20)
APPEARANCE UR: CLEAR
AST SERPL W P-5'-P-CCNC: 18 U/L (ref 9–39)
BASOPHILS # BLD AUTO: 0.05 X10*3/UL (ref 0–0.1)
BASOPHILS NFR BLD AUTO: 0.5 %
BILIRUB SERPL-MCNC: 0.6 MG/DL (ref 0–1.2)
BILIRUB UR STRIP.AUTO-MCNC: NEGATIVE MG/DL
BUN SERPL-MCNC: 25 MG/DL (ref 6–23)
CALCIUM SERPL-MCNC: 9.1 MG/DL (ref 8.6–10.3)
CHLORIDE SERPL-SCNC: 101 MMOL/L (ref 98–107)
CO2 SERPL-SCNC: 26 MMOL/L (ref 21–32)
COLOR UR: YELLOW
CREAT SERPL-MCNC: 1.44 MG/DL (ref 0.5–1.3)
EGFRCR SERPLBLD CKD-EPI 2021: 46 ML/MIN/1.73M*2
EOSINOPHIL # BLD AUTO: 0.09 X10*3/UL (ref 0–0.4)
EOSINOPHIL NFR BLD AUTO: 0.9 %
ERYTHROCYTE [DISTWIDTH] IN BLOOD BY AUTOMATED COUNT: 13.4 % (ref 11.5–14.5)
GLUCOSE SERPL-MCNC: 187 MG/DL (ref 74–99)
GLUCOSE UR STRIP.AUTO-MCNC: NORMAL MG/DL
HCT VFR BLD AUTO: 45.4 % (ref 41–52)
HGB BLD-MCNC: 14.9 G/DL (ref 13.5–17.5)
HYALINE CASTS #/AREA URNS AUTO: ABNORMAL /LPF
IMM GRANULOCYTES # BLD AUTO: 0.03 X10*3/UL (ref 0–0.5)
IMM GRANULOCYTES NFR BLD AUTO: 0.3 % (ref 0–0.9)
KETONES UR STRIP.AUTO-MCNC: NEGATIVE MG/DL
LEUKOCYTE ESTERASE UR QL STRIP.AUTO: ABNORMAL
LYMPHOCYTES # BLD AUTO: 1.09 X10*3/UL (ref 0.8–3)
LYMPHOCYTES NFR BLD AUTO: 11.1 %
MCH RBC QN AUTO: 30.2 PG (ref 26–34)
MCHC RBC AUTO-ENTMCNC: 32.8 G/DL (ref 32–36)
MCV RBC AUTO: 92 FL (ref 80–100)
MONOCYTES # BLD AUTO: 0.97 X10*3/UL (ref 0.05–0.8)
MONOCYTES NFR BLD AUTO: 9.9 %
MUCOUS THREADS #/AREA URNS AUTO: ABNORMAL /LPF
NEUTROPHILS # BLD AUTO: 7.55 X10*3/UL (ref 1.6–5.5)
NEUTROPHILS NFR BLD AUTO: 77.3 %
NITRITE UR QL STRIP.AUTO: NEGATIVE
NRBC BLD-RTO: 0 /100 WBCS (ref 0–0)
PH UR STRIP.AUTO: 5 [PH]
PLATELET # BLD AUTO: 189 X10*3/UL (ref 150–450)
POTASSIUM SERPL-SCNC: 3.9 MMOL/L (ref 3.5–5.3)
PROT SERPL-MCNC: 7.4 G/DL (ref 6.4–8.2)
PROT UR STRIP.AUTO-MCNC: ABNORMAL MG/DL
RBC # BLD AUTO: 4.94 X10*6/UL (ref 4.5–5.9)
RBC # UR STRIP.AUTO: NEGATIVE /UL
RBC #/AREA URNS AUTO: ABNORMAL /HPF
SODIUM SERPL-SCNC: 135 MMOL/L (ref 136–145)
SP GR UR STRIP.AUTO: 1.02
SQUAMOUS #/AREA URNS AUTO: ABNORMAL /HPF
UROBILINOGEN UR STRIP.AUTO-MCNC: NORMAL MG/DL
WBC # BLD AUTO: 9.8 X10*3/UL (ref 4.4–11.3)
WBC #/AREA URNS AUTO: ABNORMAL /HPF

## 2024-09-30 PROCEDURE — 87086 URINE CULTURE/COLONY COUNT: CPT | Mod: GEALAB

## 2024-09-30 PROCEDURE — 2500000004 HC RX 250 GENERAL PHARMACY W/ HCPCS (ALT 636 FOR OP/ED)

## 2024-09-30 PROCEDURE — 80053 COMPREHEN METABOLIC PANEL: CPT

## 2024-09-30 PROCEDURE — 81001 URINALYSIS AUTO W/SCOPE: CPT

## 2024-09-30 PROCEDURE — 96361 HYDRATE IV INFUSION ADD-ON: CPT

## 2024-09-30 PROCEDURE — 99284 EMERGENCY DEPT VISIT MOD MDM: CPT | Mod: 25

## 2024-09-30 PROCEDURE — 96360 HYDRATION IV INFUSION INIT: CPT | Mod: 59

## 2024-09-30 PROCEDURE — 2550000001 HC RX 255 CONTRASTS: Performed by: EMERGENCY MEDICINE

## 2024-09-30 PROCEDURE — 74177 CT ABD & PELVIS W/CONTRAST: CPT | Mod: FOREIGN READ | Performed by: RADIOLOGY

## 2024-09-30 PROCEDURE — 74177 CT ABD & PELVIS W/CONTRAST: CPT

## 2024-09-30 PROCEDURE — 36415 COLL VENOUS BLD VENIPUNCTURE: CPT

## 2024-09-30 PROCEDURE — 85025 COMPLETE CBC W/AUTO DIFF WBC: CPT

## 2024-09-30 RX ORDER — SULFAMETHOXAZOLE AND TRIMETHOPRIM 800; 160 MG/1; MG/1
1 TABLET ORAL EVERY 12 HOURS
Qty: 10 TABLET | Refills: 0 | Status: ON HOLD | OUTPATIENT
Start: 2024-09-30 | End: 2024-10-05

## 2024-09-30 ASSESSMENT — COLUMBIA-SUICIDE SEVERITY RATING SCALE - C-SSRS
1. IN THE PAST MONTH, HAVE YOU WISHED YOU WERE DEAD OR WISHED YOU COULD GO TO SLEEP AND NOT WAKE UP?: NO
2. HAVE YOU ACTUALLY HAD ANY THOUGHTS OF KILLING YOURSELF?: NO
6. HAVE YOU EVER DONE ANYTHING, STARTED TO DO ANYTHING, OR PREPARED TO DO ANYTHING TO END YOUR LIFE?: NO

## 2024-09-30 ASSESSMENT — PAIN - FUNCTIONAL ASSESSMENT: PAIN_FUNCTIONAL_ASSESSMENT: 0-10

## 2024-09-30 ASSESSMENT — PAIN DESCRIPTION - LOCATION: LOCATION: ABDOMEN

## 2024-09-30 ASSESSMENT — PAIN SCALES - GENERAL: PAINLEVEL_OUTOF10: 5 - MODERATE PAIN

## 2024-10-01 LAB — HOLD SPECIMEN: NORMAL

## 2024-10-01 NOTE — ED PROVIDER NOTES
HPI   Chief Complaint   Patient presents with    Abdominal Pain    Dizziness       History was provide by the patient and his daughter.   Report that for the last few days he has had this left lower abdominal pain.  Patient reports that the pain is more of dull achy pain in nature.  Patient reports that not experienced pain prior.  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    Post-herpetic polyneuropathy    Spondylosis, unspecified 10/12/2016    Degenerative arthritis of spine     Past Surgical History:   Procedure Laterality Date    BREAST LUMPECTOMY  07/15/2016    Left Breast Lumpectomy    OTHER SURGICAL HISTORY  05/18/2022    Gallbladder surgery    OTHER SURGICAL HISTORY  05/18/2022    Heart surgery    OTHER SURGICAL HISTORY  05/18/2022    Back surgery    OTHER SURGICAL HISTORY  04/15/2020    Cholecystectomy with cholangiography    OTHER SURGICAL HISTORY  04/15/2020    Esophagogastroduodenoscopy    TONSILLECTOMY  06/25/2018    Tonsillectomy    TOTAL KNEE ARTHROPLASTY  01/04/2023    Total Knee Arthroplasty     No family history on file.  Social History     Tobacco Use    Smoking status: Never     Passive exposure: Never    Smokeless tobacco: Never   Vaping Use    Vaping status: Never Used   Substance Use Topics    Alcohol use: Never    Drug use: Never       Physical Exam   ED Triage Vitals [09/30/24 1938]   Temperature Heart Rate Respirations BP   36.2 °C (97.2 °F) 80 18 111/69      Pulse Ox Temp src Heart Rate Source  Patient Position   96 % -- -- --      BP Location FiO2 (%)     -- --       Physical Exam  Vitals reviewed.   Constitutional:       General: He is not in acute distress.     Appearance: He is well-developed. He is obese.   HENT:      Head: Normocephalic and atraumatic.      Mouth/Throat:      Mouth: Mucous membranes are moist.      Pharynx: Oropharynx is clear.   Cardiovascular:      Rate and Rhythm: Normal rate and regular rhythm.      Heart sounds: Normal heart sounds. No murmur heard.     No friction rub. No gallop.   Pulmonary:      Effort: Pulmonary effort is normal. No respiratory distress.      Breath sounds: Normal breath sounds.   Chest:      Chest wall: No tenderness.   Abdominal:      General: Abdomen is flat. Bowel sounds are normal. There is no distension. There are no signs of injury.      Palpations: Abdomen is soft. There is no hepatomegaly or splenomegaly.      Tenderness: There is abdominal tenderness in the suprapubic area and left lower quadrant. There is no right CVA tenderness, left CVA tenderness or guarding. Negative signs include Mercado's sign and McBurney's sign.      Hernia: No hernia is present.   Skin:     General: Skin is warm and dry.      Capillary Refill: Capillary refill takes 2 to 3 seconds.   Neurological:      General: No focal deficit present.      Mental Status: He is alert and oriented to person, place, and time.      Cranial Nerves: No cranial nerve deficit.   Psychiatric:         Mood and Affect: Mood normal.         Behavior: Behavior normal.           ED Course & [facility]   ED Course as of 10/03/24 1301   Mon Sep 30, 2024   2113 CBC and Auto Differential(!)  Wnls, w/ slight elevation in absolute neutrophil [CG]   2114 Comprehensive metabolic panel(!)  Slightly increase in creatinine, consistent w/ decreased PO intake  [CG]   2210 Urinalysis with Reflex Culture and Microscopic(!)  Elevated LE [CG]   2210 Microscopic Only, Urine(!) [CG]      ED Course User Index  [CG] Ramon LOVE  MD Jessica         Diagnoses as of 10/03/24 1301   Lower urinary tract infectious disease   Left lower quadrant abdominal pain                 No data recorded                                 Medical Decision Making  89 y/o M presented with daughter for concern of ongoing abdominal pain that has not been relieved with PRN tylenol.  On exam patient is HDS and in NAD, PE significant for tenderness to palpitation of the LLQ and suprapubic region.  Suspect UTI, diverticulitis, colitis, kidney stones.  Patient endorsing pain manageable at the moment of exam and declines analgesia.  Plan on treating patient with fluids due signs of dehydration and history of decrease PO intake secondary to abdominal pain.  Plan on obtaining CT AP w/ IV contrast, CBC, CMP, UA w/ reflex.      UA significant for elevated LE.  CMP noted for slight elevation in creatinine but not to level of ADELA.  CT AP negative for any acute pathology, demonstrating diverticulosis but not diverticulitis.  Discussed the results with the patient and daughter at bedside.  Following shared decision making discussion, decided to treat the patient for suspected UTI with elevated LE, and will send abc prescription (bactrim DS) to preferred pharmacy.   Additionally discussed dietary modification for noted diverticulosis on CT AP for avoidance of developing diverticulitis, and recommended increased fluid intake and repeat RFP with PCP in a few days.  Patient and daughter acknowledged and endorsed understanding.  Patient will be discharged home with follow up instruction and abc prescription sent to preferred pharmacy.          Procedure  Procedures     Ramon Lopez MD  Resident  10/03/24 1319

## 2024-10-02 ENCOUNTER — APPOINTMENT (OUTPATIENT)
Dept: RADIOLOGY | Facility: HOSPITAL | Age: 89
DRG: 291 | End: 2024-10-02
Payer: MEDICARE

## 2024-10-02 ENCOUNTER — APPOINTMENT (OUTPATIENT)
Dept: CARDIOLOGY | Facility: HOSPITAL | Age: 89
DRG: 291 | End: 2024-10-02
Payer: MEDICARE

## 2024-10-02 ENCOUNTER — HOSPITAL ENCOUNTER (INPATIENT)
Facility: HOSPITAL | Age: 89
End: 2024-10-02
Attending: STUDENT IN AN ORGANIZED HEALTH CARE EDUCATION/TRAINING PROGRAM | Admitting: STUDENT IN AN ORGANIZED HEALTH CARE EDUCATION/TRAINING PROGRAM
Payer: MEDICARE

## 2024-10-02 DIAGNOSIS — I50.9 ACUTE ON CHRONIC HEART FAILURE, UNSPECIFIED HEART FAILURE TYPE: ICD-10-CM

## 2024-10-02 DIAGNOSIS — I95.1 ORTHOSTATIC HYPOTENSION: ICD-10-CM

## 2024-10-02 DIAGNOSIS — J96.01 ACUTE RESPIRATORY FAILURE WITH HYPOXIA (MULTI): Primary | ICD-10-CM

## 2024-10-02 DIAGNOSIS — R53.1 GENERALIZED WEAKNESS: ICD-10-CM

## 2024-10-02 LAB
ABO GROUP (TYPE) IN BLOOD: NORMAL
ALBUMIN SERPL BCP-MCNC: 3.7 G/DL (ref 3.4–5)
ALP SERPL-CCNC: 60 U/L (ref 33–136)
ALT SERPL W P-5'-P-CCNC: 10 U/L (ref 10–52)
ANION GAP BLDV CALCULATED.4IONS-SCNC: 10 MMOL/L (ref 10–25)
ANION GAP SERPL CALC-SCNC: 12 MMOL/L (ref 10–20)
ANTIBODY SCREEN: NORMAL
APPEARANCE UR: CLEAR
AST SERPL W P-5'-P-CCNC: 18 U/L (ref 9–39)
BACTERIA UR CULT: NORMAL
BASE EXCESS BLDV CALC-SCNC: 0.8 MMOL/L (ref -2–3)
BASOPHILS # BLD AUTO: 0.03 X10*3/UL (ref 0–0.1)
BASOPHILS NFR BLD AUTO: 0.3 %
BILIRUB SERPL-MCNC: 0.6 MG/DL (ref 0–1.2)
BILIRUB UR STRIP.AUTO-MCNC: NEGATIVE MG/DL
BNP SERPL-MCNC: 529 PG/ML (ref 0–99)
BODY TEMPERATURE: ABNORMAL
BUN SERPL-MCNC: 19 MG/DL (ref 6–23)
CA-I BLDV-SCNC: 1.15 MMOL/L (ref 1.1–1.33)
CALCIUM SERPL-MCNC: 8.8 MG/DL (ref 8.6–10.3)
CARDIAC TROPONIN I PNL SERPL HS: 38 NG/L (ref 0–20)
CARDIAC TROPONIN I PNL SERPL HS: 43 NG/L (ref 0–20)
CARDIAC TROPONIN I PNL SERPL HS: 47 NG/L (ref 0–20)
CHLORIDE BLDV-SCNC: 100 MMOL/L (ref 98–107)
CHLORIDE SERPL-SCNC: 100 MMOL/L (ref 98–107)
CO2 SERPL-SCNC: 25 MMOL/L (ref 21–32)
COLOR UR: COLORLESS
CREAT SERPL-MCNC: 1.44 MG/DL (ref 0.5–1.3)
EGFRCR SERPLBLD CKD-EPI 2021: 46 ML/MIN/1.73M*2
EOSINOPHIL # BLD AUTO: 0.01 X10*3/UL (ref 0–0.4)
EOSINOPHIL NFR BLD AUTO: 0.1 %
ERYTHROCYTE [DISTWIDTH] IN BLOOD BY AUTOMATED COUNT: 13.2 % (ref 11.5–14.5)
FLUAV RNA RESP QL NAA+PROBE: NOT DETECTED
FLUBV RNA RESP QL NAA+PROBE: NOT DETECTED
GLUCOSE BLDV-MCNC: 115 MG/DL (ref 74–99)
GLUCOSE SERPL-MCNC: 129 MG/DL (ref 74–99)
GLUCOSE UR STRIP.AUTO-MCNC: NORMAL MG/DL
HCO3 BLDV-SCNC: 26 MMOL/L (ref 22–26)
HCT VFR BLD AUTO: 35.3 % (ref 41–52)
HCT VFR BLD EST: 41 % (ref 41–52)
HGB BLD-MCNC: 12.1 G/DL (ref 13.5–17.5)
HGB BLDV-MCNC: 13.6 G/DL (ref 13.5–17.5)
IMM GRANULOCYTES # BLD AUTO: 0.05 X10*3/UL (ref 0–0.5)
IMM GRANULOCYTES NFR BLD AUTO: 0.6 % (ref 0–0.9)
INHALED O2 CONCENTRATION: 32 %
INR PPP: 1.4 (ref 0.9–1.1)
KETONES UR STRIP.AUTO-MCNC: NEGATIVE MG/DL
LACTATE BLDV-SCNC: 1.7 MMOL/L (ref 0.4–2)
LACTATE SERPL-SCNC: 1.4 MMOL/L (ref 0.4–2)
LEUKOCYTE ESTERASE UR QL STRIP.AUTO: NEGATIVE
LYMPHOCYTES # BLD AUTO: 0.16 X10*3/UL (ref 0.8–3)
LYMPHOCYTES NFR BLD AUTO: 1.8 %
MAGNESIUM SERPL-MCNC: 1.38 MG/DL (ref 1.6–2.4)
MCH RBC QN AUTO: 30.3 PG (ref 26–34)
MCHC RBC AUTO-ENTMCNC: 34.3 G/DL (ref 32–36)
MCV RBC AUTO: 89 FL (ref 80–100)
MONOCYTES # BLD AUTO: 0.49 X10*3/UL (ref 0.05–0.8)
MONOCYTES NFR BLD AUTO: 5.5 %
NEUTROPHILS # BLD AUTO: 8.1 X10*3/UL (ref 1.6–5.5)
NEUTROPHILS NFR BLD AUTO: 91.7 %
NITRITE UR QL STRIP.AUTO: NEGATIVE
NRBC BLD-RTO: 0 /100 WBCS (ref 0–0)
OXYHGB MFR BLDV: 57.3 % (ref 45–75)
PCO2 BLDV: 43 MM HG (ref 41–51)
PH BLDV: 7.39 PH (ref 7.33–7.43)
PH UR STRIP.AUTO: 5 [PH]
PLATELET # BLD AUTO: 152 X10*3/UL (ref 150–450)
PO2 BLDV: 33 MM HG (ref 35–45)
POTASSIUM BLDV-SCNC: 3.9 MMOL/L (ref 3.5–5.3)
POTASSIUM SERPL-SCNC: 3.9 MMOL/L (ref 3.5–5.3)
PROT SERPL-MCNC: 6.9 G/DL (ref 6.4–8.2)
PROT UR STRIP.AUTO-MCNC: NEGATIVE MG/DL
PROTHROMBIN TIME: 15.8 SECONDS (ref 9.8–12.8)
RBC # BLD AUTO: 3.99 X10*6/UL (ref 4.5–5.9)
RBC # UR STRIP.AUTO: NEGATIVE /UL
RH FACTOR (ANTIGEN D): NORMAL
SAO2 % BLDV: 59 % (ref 45–75)
SARS-COV-2 RNA RESP QL NAA+PROBE: NOT DETECTED
SODIUM BLDV-SCNC: 132 MMOL/L (ref 136–145)
SODIUM SERPL-SCNC: 133 MMOL/L (ref 136–145)
SP GR UR STRIP.AUTO: 1.01
UROBILINOGEN UR STRIP.AUTO-MCNC: NORMAL MG/DL
WBC # BLD AUTO: 8.8 X10*3/UL (ref 4.4–11.3)

## 2024-10-02 PROCEDURE — 71275 CT ANGIOGRAPHY CHEST: CPT

## 2024-10-02 PROCEDURE — 99285 EMERGENCY DEPT VISIT HI MDM: CPT | Mod: 25

## 2024-10-02 PROCEDURE — 80053 COMPREHEN METABOLIC PANEL: CPT | Performed by: HEALTH CARE PROVIDER

## 2024-10-02 PROCEDURE — 71045 X-RAY EXAM CHEST 1 VIEW: CPT | Performed by: RADIOLOGY

## 2024-10-02 PROCEDURE — 96375 TX/PRO/DX INJ NEW DRUG ADDON: CPT

## 2024-10-02 PROCEDURE — 2500000004 HC RX 250 GENERAL PHARMACY W/ HCPCS (ALT 636 FOR OP/ED): Performed by: NURSE PRACTITIONER

## 2024-10-02 PROCEDURE — 2500000001 HC RX 250 WO HCPCS SELF ADMINISTERED DRUGS (ALT 637 FOR MEDICARE OP): Performed by: NURSE PRACTITIONER

## 2024-10-02 PROCEDURE — 84132 ASSAY OF SERUM POTASSIUM: CPT | Performed by: HEALTH CARE PROVIDER

## 2024-10-02 PROCEDURE — 70450 CT HEAD/BRAIN W/O DYE: CPT | Performed by: RADIOLOGY

## 2024-10-02 PROCEDURE — 86901 BLOOD TYPING SEROLOGIC RH(D): CPT | Performed by: HEALTH CARE PROVIDER

## 2024-10-02 PROCEDURE — 1200000002 HC GENERAL ROOM WITH TELEMETRY DAILY

## 2024-10-02 PROCEDURE — 2500000002 HC RX 250 W HCPCS SELF ADMINISTERED DRUGS (ALT 637 FOR MEDICARE OP, ALT 636 FOR OP/ED): Performed by: NURSE PRACTITIONER

## 2024-10-02 PROCEDURE — 81003 URINALYSIS AUTO W/O SCOPE: CPT | Performed by: HEALTH CARE PROVIDER

## 2024-10-02 PROCEDURE — 83880 ASSAY OF NATRIURETIC PEPTIDE: CPT | Performed by: HEALTH CARE PROVIDER

## 2024-10-02 PROCEDURE — 36415 COLL VENOUS BLD VENIPUNCTURE: CPT | Performed by: HEALTH CARE PROVIDER

## 2024-10-02 PROCEDURE — 2550000001 HC RX 255 CONTRASTS: Performed by: HEALTH CARE PROVIDER

## 2024-10-02 PROCEDURE — 85025 COMPLETE CBC W/AUTO DIFF WBC: CPT | Performed by: HEALTH CARE PROVIDER

## 2024-10-02 PROCEDURE — 96366 THER/PROPH/DIAG IV INF ADDON: CPT

## 2024-10-02 PROCEDURE — 86850 RBC ANTIBODY SCREEN: CPT | Performed by: HEALTH CARE PROVIDER

## 2024-10-02 PROCEDURE — 85610 PROTHROMBIN TIME: CPT | Performed by: HEALTH CARE PROVIDER

## 2024-10-02 PROCEDURE — 96365 THER/PROPH/DIAG IV INF INIT: CPT

## 2024-10-02 PROCEDURE — 2500000004 HC RX 250 GENERAL PHARMACY W/ HCPCS (ALT 636 FOR OP/ED): Performed by: HEALTH CARE PROVIDER

## 2024-10-02 PROCEDURE — 87636 SARSCOV2 & INF A&B AMP PRB: CPT | Performed by: HEALTH CARE PROVIDER

## 2024-10-02 PROCEDURE — 96361 HYDRATE IV INFUSION ADD-ON: CPT

## 2024-10-02 PROCEDURE — 84484 ASSAY OF TROPONIN QUANT: CPT | Performed by: NURSE PRACTITIONER

## 2024-10-02 PROCEDURE — 83605 ASSAY OF LACTIC ACID: CPT | Performed by: HEALTH CARE PROVIDER

## 2024-10-02 PROCEDURE — 83735 ASSAY OF MAGNESIUM: CPT | Performed by: HEALTH CARE PROVIDER

## 2024-10-02 PROCEDURE — 93005 ELECTROCARDIOGRAM TRACING: CPT

## 2024-10-02 PROCEDURE — 70450 CT HEAD/BRAIN W/O DYE: CPT

## 2024-10-02 PROCEDURE — 71045 X-RAY EXAM CHEST 1 VIEW: CPT

## 2024-10-02 PROCEDURE — 84075 ASSAY ALKALINE PHOSPHATASE: CPT | Performed by: HEALTH CARE PROVIDER

## 2024-10-02 PROCEDURE — 2500000004 HC RX 250 GENERAL PHARMACY W/ HCPCS (ALT 636 FOR OP/ED)

## 2024-10-02 PROCEDURE — 84484 ASSAY OF TROPONIN QUANT: CPT | Performed by: HEALTH CARE PROVIDER

## 2024-10-02 PROCEDURE — 99223 1ST HOSP IP/OBS HIGH 75: CPT | Performed by: STUDENT IN AN ORGANIZED HEALTH CARE EDUCATION/TRAINING PROGRAM

## 2024-10-02 PROCEDURE — 71275 CT ANGIOGRAPHY CHEST: CPT | Performed by: RADIOLOGY

## 2024-10-02 RX ORDER — CEFTRIAXONE 1 G/50ML
1 INJECTION, SOLUTION INTRAVENOUS EVERY 24 HOURS
Status: DISCONTINUED | OUTPATIENT
Start: 2024-10-02 | End: 2024-10-04

## 2024-10-02 RX ORDER — PRIMIDONE 50 MG/1
50 TABLET ORAL NIGHTLY
Status: DISPENSED | OUTPATIENT
Start: 2024-10-02

## 2024-10-02 RX ORDER — IPRATROPIUM BROMIDE AND ALBUTEROL SULFATE 2.5; .5 MG/3ML; MG/3ML
3 SOLUTION RESPIRATORY (INHALATION)
Status: DISCONTINUED | OUTPATIENT
Start: 2024-10-02 | End: 2024-10-02

## 2024-10-02 RX ORDER — ONDANSETRON 4 MG/1
4 TABLET, FILM COATED ORAL EVERY 8 HOURS PRN
Status: ACTIVE | OUTPATIENT
Start: 2024-10-02

## 2024-10-02 RX ORDER — ESCITALOPRAM OXALATE 20 MG/1
20 TABLET ORAL DAILY
Status: DISPENSED | OUTPATIENT
Start: 2024-10-02

## 2024-10-02 RX ORDER — FUROSEMIDE 10 MG/ML
40 INJECTION INTRAMUSCULAR; INTRAVENOUS EVERY 12 HOURS
Status: DISCONTINUED | OUTPATIENT
Start: 2024-10-02 | End: 2024-10-04

## 2024-10-02 RX ORDER — ROSUVASTATIN CALCIUM 10 MG/1
10 TABLET, COATED ORAL DAILY
Status: DISPENSED | OUTPATIENT
Start: 2024-10-02

## 2024-10-02 RX ORDER — ONDANSETRON HYDROCHLORIDE 2 MG/ML
4 INJECTION, SOLUTION INTRAVENOUS EVERY 8 HOURS PRN
Status: ACTIVE | OUTPATIENT
Start: 2024-10-02

## 2024-10-02 RX ORDER — IPRATROPIUM BROMIDE AND ALBUTEROL SULFATE 2.5; .5 MG/3ML; MG/3ML
3 SOLUTION RESPIRATORY (INHALATION) EVERY 2 HOUR PRN
Status: ACTIVE | OUTPATIENT
Start: 2024-10-02

## 2024-10-02 RX ORDER — FUROSEMIDE 10 MG/ML
INJECTION INTRAMUSCULAR; INTRAVENOUS
Status: COMPLETED
Start: 2024-10-02 | End: 2024-10-02

## 2024-10-02 RX ORDER — MECLIZINE HYDROCHLORIDE 25 MG/1
25 TABLET ORAL DAILY PRN
Status: ON HOLD | COMMUNITY

## 2024-10-02 RX ORDER — MAGNESIUM SULFATE HEPTAHYDRATE 40 MG/ML
2 INJECTION, SOLUTION INTRAVENOUS ONCE
Status: COMPLETED | OUTPATIENT
Start: 2024-10-02 | End: 2024-10-02

## 2024-10-02 RX ORDER — LOPERAMIDE HYDROCHLORIDE 2 MG/1
2 CAPSULE ORAL 4 TIMES DAILY PRN
Status: ACTIVE | OUTPATIENT
Start: 2024-10-02

## 2024-10-02 RX ORDER — CHOLECALCIFEROL (VITAMIN D3) 25 MCG
2000 TABLET ORAL DAILY
Status: DISPENSED | OUTPATIENT
Start: 2024-10-02

## 2024-10-02 RX ORDER — ACETAMINOPHEN 325 MG/1
650 TABLET ORAL EVERY 6 HOURS PRN
Status: ACTIVE | OUTPATIENT
Start: 2024-10-02

## 2024-10-02 RX ORDER — TALC
3 POWDER (GRAM) TOPICAL NIGHTLY PRN
Status: ACTIVE | OUTPATIENT
Start: 2024-10-02

## 2024-10-02 RX ORDER — MECLIZINE HYDROCHLORIDE 25 MG/1
25 TABLET ORAL DAILY PRN
Status: ACTIVE | OUTPATIENT
Start: 2024-10-02

## 2024-10-02 RX ORDER — DOCUSATE SODIUM 50 MG/5ML
100 LIQUID ORAL 2 TIMES DAILY
Status: DISPENSED | OUTPATIENT
Start: 2024-10-02

## 2024-10-02 RX ORDER — FUROSEMIDE 10 MG/ML
20 INJECTION INTRAMUSCULAR; INTRAVENOUS ONCE
Status: COMPLETED | OUTPATIENT
Start: 2024-10-02 | End: 2024-10-02

## 2024-10-02 SDOH — ECONOMIC STABILITY: FOOD INSECURITY: WITHIN THE PAST 12 MONTHS, THE FOOD YOU BOUGHT JUST DIDN'T LAST AND YOU DIDN'T HAVE MONEY TO GET MORE.: NEVER TRUE

## 2024-10-02 SDOH — HEALTH STABILITY: MENTAL HEALTH: HOW OFTEN DO YOU HAVE 6 OR MORE DRINKS ON ONE OCCASION?: NEVER

## 2024-10-02 SDOH — ECONOMIC STABILITY: TRANSPORTATION INSECURITY: IN THE PAST 12 MONTHS, HAS LACK OF TRANSPORTATION KEPT YOU FROM MEDICAL APPOINTMENTS OR FROM GETTING MEDICATIONS?: NO

## 2024-10-02 SDOH — SOCIAL STABILITY: SOCIAL INSECURITY: DO YOU FEEL ANYONE HAS EXPLOITED OR TAKEN ADVANTAGE OF YOU FINANCIALLY OR OF YOUR PERSONAL PROPERTY?: NO

## 2024-10-02 SDOH — ECONOMIC STABILITY: GENERAL

## 2024-10-02 SDOH — ECONOMIC STABILITY: TRANSPORTATION INSECURITY
IN THE PAST 12 MONTHS, HAS LACK OF TRANSPORTATION KEPT YOU FROM MEETINGS, WORK, OR FROM GETTING THINGS NEEDED FOR DAILY LIVING?: NO

## 2024-10-02 SDOH — ECONOMIC STABILITY: TRANSPORTATION INSECURITY
IN THE PAST 12 MONTHS, HAS THE LACK OF TRANSPORTATION KEPT YOU FROM MEDICAL APPOINTMENTS OR FROM GETTING MEDICATIONS?: NO

## 2024-10-02 SDOH — HEALTH STABILITY: MENTAL HEALTH: HOW MANY STANDARD DRINKS CONTAINING ALCOHOL DO YOU HAVE ON A TYPICAL DAY?: PATIENT DOES NOT DRINK

## 2024-10-02 SDOH — ECONOMIC STABILITY: FOOD INSECURITY: WITHIN THE PAST 12 MONTHS, YOU WORRIED THAT YOUR FOOD WOULD RUN OUT BEFORE YOU GOT MONEY TO BUY MORE.: NEVER TRUE

## 2024-10-02 SDOH — SOCIAL STABILITY: SOCIAL INSECURITY: ABUSE: ADULT

## 2024-10-02 SDOH — ECONOMIC STABILITY: HOUSING INSECURITY: AT ANY TIME IN THE PAST 12 MONTHS, WERE YOU HOMELESS OR LIVING IN A SHELTER (INCLUDING NOW)?: NO

## 2024-10-02 SDOH — SOCIAL STABILITY: SOCIAL INSECURITY: ARE YOU OR HAVE YOU BEEN THREATENED OR ABUSED PHYSICALLY, EMOTIONALLY, OR SEXUALLY BY ANYONE?: NO

## 2024-10-02 SDOH — SOCIAL STABILITY: SOCIAL INSECURITY: HAVE YOU HAD THOUGHTS OF HARMING ANYONE ELSE?: NO

## 2024-10-02 SDOH — ECONOMIC STABILITY: TRANSPORTATION INSECURITY

## 2024-10-02 SDOH — HEALTH STABILITY: MENTAL HEALTH: HOW OFTEN DO YOU HAVE A DRINK CONTAINING ALCOHOL?: NEVER

## 2024-10-02 SDOH — ECONOMIC STABILITY: HOUSING INSECURITY

## 2024-10-02 SDOH — SOCIAL STABILITY: SOCIAL INSECURITY: WITHIN THE LAST YEAR, HAVE YOU BEEN HUMILIATED OR EMOTIONALLY ABUSED IN OTHER WAYS BY YOUR PARTNER OR EX-PARTNER?: NO

## 2024-10-02 SDOH — ECONOMIC STABILITY: HOUSING INSECURITY: IN THE PAST 12 MONTHS HAS THE ELECTRIC, GAS, OIL, OR WATER COMPANY THREATENED TO SHUT OFF SERVICES IN YOUR HOME?: NO

## 2024-10-02 SDOH — SOCIAL STABILITY: SOCIAL INSECURITY: DO YOU FEEL UNSAFE GOING BACK TO THE PLACE WHERE YOU ARE LIVING?: NO

## 2024-10-02 SDOH — ECONOMIC STABILITY: FOOD INSECURITY: WITHIN THE PAST 12 MONTHS, YOU WORRIED THAT YOUR FOOD WOULD RUN OUT BEFORE YOU GOT THE MONEY TO BUY MORE.: NEVER TRUE

## 2024-10-02 SDOH — ECONOMIC STABILITY: INCOME INSECURITY: IN THE LAST 12 MONTHS, WAS THERE A TIME WHEN YOU WERE NOT ABLE TO PAY THE MORTGAGE OR RENT ON TIME?: NO

## 2024-10-02 SDOH — SOCIAL STABILITY: SOCIAL INSECURITY: WERE YOU ABLE TO COMPLETE ALL THE BEHAVIORAL HEALTH SCREENINGS?: YES

## 2024-10-02 SDOH — SOCIAL STABILITY: SOCIAL INSECURITY
WITHIN THE LAST YEAR, HAVE YOU BEEN KICKED, HIT, SLAPPED, OR OTHERWISE PHYSICALLY HURT BY YOUR PARTNER OR EX-PARTNER?: NO

## 2024-10-02 SDOH — SOCIAL STABILITY: SOCIAL INSECURITY: DOES ANYONE TRY TO KEEP YOU FROM HAVING/CONTACTING OTHER FRIENDS OR DOING THINGS OUTSIDE YOUR HOME?: NO

## 2024-10-02 SDOH — ECONOMIC STABILITY: INCOME INSECURITY: HOW HARD IS IT FOR YOU TO PAY FOR THE VERY BASICS LIKE FOOD, HOUSING, MEDICAL CARE, AND HEATING?: NOT HARD AT ALL

## 2024-10-02 SDOH — ECONOMIC STABILITY: INCOME INSECURITY: IN THE PAST 12 MONTHS, HAS THE ELECTRIC, GAS, OIL, OR WATER COMPANY THREATENED TO SHUT OFF SERVICE IN YOUR HOME?: NO

## 2024-10-02 SDOH — SOCIAL STABILITY: SOCIAL INSECURITY: WITHIN THE LAST YEAR, HAVE YOU BEEN AFRAID OF YOUR PARTNER OR EX-PARTNER?: NO

## 2024-10-02 SDOH — ECONOMIC STABILITY: FOOD INSECURITY

## 2024-10-02 SDOH — SOCIAL STABILITY: SOCIAL INSECURITY
WITHIN THE LAST YEAR, HAVE TO BEEN RAPED OR FORCED TO HAVE ANY KIND OF SEXUAL ACTIVITY BY YOUR PARTNER OR EX-PARTNER?: NO

## 2024-10-02 SDOH — SOCIAL STABILITY: SOCIAL INSECURITY: HAVE YOU HAD ANY THOUGHTS OF HARMING ANYONE ELSE?: NO

## 2024-10-02 SDOH — ECONOMIC STABILITY: HOUSING INSECURITY: IN THE LAST 12 MONTHS, HOW MANY PLACES HAVE YOU LIVED?: 1

## 2024-10-02 SDOH — ECONOMIC STABILITY: HOUSING INSECURITY: IN THE PAST 12 MONTHS, HOW MANY TIMES HAVE YOU MOVED WHERE YOU WERE LIVING?: 1

## 2024-10-02 SDOH — SOCIAL STABILITY: SOCIAL INSECURITY: ARE THERE ANY APPARENT SIGNS OF INJURIES/BEHAVIORS THAT COULD BE RELATED TO ABUSE/NEGLECT?: NO

## 2024-10-02 SDOH — SOCIAL STABILITY: SOCIAL INSECURITY: HAS ANYONE EVER THREATENED TO HURT YOUR FAMILY OR YOUR PETS?: NO

## 2024-10-02 SDOH — ECONOMIC STABILITY: HOUSING INSECURITY: IN THE LAST 12 MONTHS, WAS THERE A TIME WHEN YOU WERE NOT ABLE TO PAY THE MORTGAGE OR RENT ON TIME?: NO

## 2024-10-02 SDOH — ECONOMIC STABILITY: HOUSING INSECURITY
IN THE LAST 12 MONTHS, WAS THERE A TIME WHEN YOU DID NOT HAVE A STEADY PLACE TO SLEEP OR SLEPT IN A SHELTER (INCLUDING NOW)?: NO

## 2024-10-02 ASSESSMENT — COGNITIVE AND FUNCTIONAL STATUS - GENERAL
HELP NEEDED FOR BATHING: A LITTLE
TOILETING: A LITTLE
DAILY ACTIVITIY SCORE: 22
WALKING IN HOSPITAL ROOM: A LITTLE
STANDING UP FROM CHAIR USING ARMS: A LITTLE
MOBILITY SCORE: 20
CLIMB 3 TO 5 STEPS WITH RAILING: A LITTLE
MOVING TO AND FROM BED TO CHAIR: A LITTLE
PATIENT BASELINE BEDBOUND: NO

## 2024-10-02 ASSESSMENT — LIFESTYLE VARIABLES
EVER FELT BAD OR GUILTY ABOUT YOUR DRINKING: NO
HAVE PEOPLE ANNOYED YOU BY CRITICIZING YOUR DRINKING: NO
EVER HAD A DRINK FIRST THING IN THE MORNING TO STEADY YOUR NERVES TO GET RID OF A HANGOVER: NO
HAVE YOU EVER FELT YOU SHOULD CUT DOWN ON YOUR DRINKING: NO
HOW OFTEN DO YOU HAVE A DRINK CONTAINING ALCOHOL: NEVER
SKIP TO QUESTIONS 9-10: 1
TOTAL SCORE: 0
AUDIT-C TOTAL SCORE: 0

## 2024-10-02 ASSESSMENT — ACTIVITIES OF DAILY LIVING (ADL)
HEARING - LEFT EAR: HEARING AID
HEARING - RIGHT EAR: HEARING AID
WALKS IN HOME: INDEPENDENT
BATHING: INDEPENDENT
LACK_OF_TRANSPORTATION: NO
GROOMING: INDEPENDENT
FEEDING YOURSELF: INDEPENDENT
PATIENT'S MEMORY ADEQUATE TO SAFELY COMPLETE DAILY ACTIVITIES?: YES
JUDGMENT_ADEQUATE_SAFELY_COMPLETE_DAILY_ACTIVITIES: YES
LACK_OF_TRANSPORTATION: NO
ADEQUATE_TO_COMPLETE_ADL: YES
DRESSING YOURSELF: INDEPENDENT
TOILETING: INDEPENDENT

## 2024-10-02 ASSESSMENT — COLUMBIA-SUICIDE SEVERITY RATING SCALE - C-SSRS
6. HAVE YOU EVER DONE ANYTHING, STARTED TO DO ANYTHING, OR PREPARED TO DO ANYTHING TO END YOUR LIFE?: NO
1. IN THE PAST MONTH, HAVE YOU WISHED YOU WERE DEAD OR WISHED YOU COULD GO TO SLEEP AND NOT WAKE UP?: NO
2. HAVE YOU ACTUALLY HAD ANY THOUGHTS OF KILLING YOURSELF?: NO

## 2024-10-02 ASSESSMENT — PAIN SCALES - GENERAL: PAINLEVEL_OUTOF10: 0 - NO PAIN

## 2024-10-02 ASSESSMENT — SOCIAL DETERMINANTS OF HEALTH (SDOH): IN THE PAST 12 MONTHS, HAS THE ELECTRIC, GAS, OIL, OR WATER COMPANY THREATENED TO SHUT OFF SERVICE IN YOUR HOME?: NO

## 2024-10-02 ASSESSMENT — PATIENT HEALTH QUESTIONNAIRE - PHQ9
SUM OF ALL RESPONSES TO PHQ9 QUESTIONS 1 & 2: 0
1. LITTLE INTEREST OR PLEASURE IN DOING THINGS: NOT AT ALL
2. FEELING DOWN, DEPRESSED OR HOPELESS: NOT AT ALL

## 2024-10-02 ASSESSMENT — PAIN - FUNCTIONAL ASSESSMENT: PAIN_FUNCTIONAL_ASSESSMENT: 0-10

## 2024-10-02 NOTE — ED TRIAGE NOTES
Patient c/o generalized weakness, nausea and diarrhea that started this morning when he woke up, patient was recently here and diagnosed with a UTI which he is currently being treated for.

## 2024-10-02 NOTE — ED PROVIDER NOTES
HPI   No chief complaint on file.      CC: Generalized weakness  HPI:   89-year-old male with history of A-fib on long-term anticoagulation presents to ED via EMS from home for complaints of generalized weakness, patient denies any chest pain, shortness of breath, he denies any abdominal pain, reports he had an episode of diarrhea this morning however he denies that it appeared dark or tarry, patient denies any hemoptysis hematemesis hematochezia or melanotic stools.  He denies any recent falls.  Denies having any fever, chills.    - has a past medical history of Benign neoplasm of left breast, Encounter for screening for malignant neoplasm of colon, Personal history of diseases of the skin and subcutaneous tissue, Personal history of other specified conditions, Postherpetic polyneuropathy, and Spondylosis, unspecified.  - has a past surgical history that includes Breast lumpectomy (07/15/2016); Other surgical history (05/18/2022); Other surgical history (05/18/2022); Other surgical history (05/18/2022); Other surgical history (04/15/2020); Other surgical history (04/15/2020); Total knee arthroplasty (01/04/2023); and Tonsillectomy (06/25/2018).  - has Arthritis of lumbar spine; Chronic venous insufficiency; Combined hyperlipidemia; Episode of moderate major depression (Multi); Essential tremor; GERD without esophagitis; Hemangioma of skin and subcutaneous tissue; Melanocytic nevi of trunk; Obstructive sleep apnea; Pacemaker; Post-herpetic polyneuropathy; Right bundle branch block (RBBB) on electrocardiogram (ECG); Sensorineural hearing loss (SNHL) of both ears; Vitamin B12 deficiency; Vitamin D deficiency; Primary hypertension; Bilateral carotid artery occlusion; Nonrheumatic mitral valve regurgitation; and Dyslipidemia on their problem list.         Additional Limitations to History:   External Records Reviewed: I reviewed recent and relevant outside records including   History Obtained From:     Past Medical  History: Per HPI  Medications: Reviewed in EMR and with patient  Allergies:  Reviewed in EMR  Past Surgical History:   Social History:     ------------------------------------------------------------------------------------------------------  Physical Exam:  --Vital signs reviewed in nursing triage note, EMR flow sheets, and at patient's bedside  GEN:  A&Ox3, no acute distress, appears comfortable.  Conversational and appropriate.  No confusion or gross mental status changes.  EYES: EOMI, non-injected sclera.  ENT: Moist mucous membranes, no apparent injuries or lesions.   CARDIO: Normal rate and regular rhythm. No murmurs, rubs, or gallops.  2+ equal pulses of the distal extremities.   PULM: Clear to auscultation bilaterally. No rales, rhonchi, or wheezes. Good symmetric chest expansion.  GI: Soft, non-tender, non-distended. No rebound tenderness or guarding.  SKIN: Warm and dry, no rashes or lesions.  MSK: ROM intact the extremities without contractures.   EXT: No peripheral edema, contusions, or wounds.   NEURO: Cranial nerves II-XII grossly intact. Sensation to light touch intact and equal bilaterally in upper and lower extremities.  Symmetric 5/5 strength in upper and lower extremities.  PSYCH: Appropriate mood and behavior, converses and responds appropriately during exam.  -------------------------------------------------------------------------------------------------------      Differential Diagnoses Considered:   Chronic Medical Conditions Significantly Affecting Care:   Diagnostic testing considered: [PERC, D-Dimer, PECARN, etc.]    - EKG interpreted by myself sinus rhythm ventricular 76 AK interval 190 normal QRS duration of prolonged QT/QTc no obvious ST elevation, depression or acute ischemic findings.  - I independently interpreted: [CXR, CT, POCUS, etc. including your interpretation]  - Labs notable for     Escalation of Care: Appropriate for  Social Determinants of Health Significantly Affecting  Care: [Homelessness, lacking transportation, uninsured, unable to afford medications]  Prescription Drug Consideration: [Antibiotics, antivirals, pain medications, etc.]  Discussion of Management with Other Providers:  I discussed the patient/results with: [admitting team, consultant, radiologist, social work, EPAT, case management, PT/OT, RT, PCP, etc.]      Solis Gutierrez PA-C              Patient History   Past Medical History:   Diagnosis Date    Benign neoplasm of left breast     Benign neoplasm of left breast    Encounter for screening for malignant neoplasm of colon     Colon cancer screening    Personal history of diseases of the skin and subcutaneous tissue 07/15/2016    History of sebaceous cyst    Personal history of other specified conditions     History of vertigo    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    Post-herpetic polyneuropathy    Spondylosis, unspecified 10/12/2016    Degenerative arthritis of spine     Past Surgical History:   Procedure Laterality Date    BREAST LUMPECTOMY  07/15/2016    Left Breast Lumpectomy    OTHER SURGICAL HISTORY  05/18/2022    Gallbladder surgery    OTHER SURGICAL HISTORY  05/18/2022    Heart surgery    OTHER SURGICAL HISTORY  05/18/2022    Back surgery    OTHER SURGICAL HISTORY  04/15/2020    Cholecystectomy with cholangiography    OTHER SURGICAL HISTORY  04/15/2020    Esophagogastroduodenoscopy    TONSILLECTOMY  06/25/2018    Tonsillectomy    TOTAL KNEE ARTHROPLASTY  01/04/2023    Total Knee Arthroplasty     No family history on file.  Social History     Tobacco Use    Smoking status: Never     Passive exposure: Never    Smokeless tobacco: Never   Vaping Use    Vaping status: Never Used   Substance Use Topics    Alcohol use: Never    Drug use: Never       Physical Exam   ED Triage Vitals   Temp Pulse Resp BP   -- -- -- --      SpO2 Temp src Heart Rate Source Patient Position   -- -- -- --      BP Location FiO2 (%)     -- --       Physical Exam      ED Course & MDM    Diagnoses as of 10/04/24 0822   Acute respiratory failure with hypoxia (Multi)   Generalized weakness   Acute on chronic heart failure, unspecified heart failure type                 No data recorded                                 Medical Decision Making  89-year-old male with history of A-fib, on Eliquis, with complaints of generalized weakness possibly in the setting of acute on chronic heart failure, acute respiratory failure with hypoxia, requiring oxygen, no ischemic findings on ECG, negative PE, no concerning findings on CT of the head, laboratory workup no leukocytosis, renal function appears to be baseline and his magnesium was slightly decreased and replaced in the ED, elevated BNP, and serial troponins slightly elevated as well, there does not appear to be any cause of infectious process at this time, he remains hemodynamically stable nontoxic-appearing afebrile in no acute distress, patient will be placed in inpatient telemetry for acute respiratory failure workup        Procedure  Procedures     Solis Gutierrez PA-C  10/02/24 1127       Solis Gutierrez PA-C  10/04/24 0827

## 2024-10-02 NOTE — PROGRESS NOTES
"Pharmacy Medication History Review    Garrison Rodriguez \"Jack\" is a 89 y.o. male admitted for Acute respiratory failure with hypoxia (Multi). Pharmacy reviewed the patient's [id number] medications and allergies for accuracy.    The list below reflectives the updated PTA list. Please review each medication in order reconciliation for additional clarification and justification.  Prior to Admission Medications   Prescriptions Last Dose Informant Patient Reported? Taking?   ZINC ORAL 10/1/2024 at am Child Yes Yes   Sig: Take 1 capsule by mouth once daily.   acetaminophen (Tylenol) 500 mg tablet 9/28/2024 Child Yes Yes   Sig: Take 1 tablet (500 mg) by mouth every 6 hours if needed.   apixaban (Eliquis) 5 mg tablet 10/1/2024 at am Child Yes Yes   Sig: Take 1 tablet (5 mg) by mouth 2 times a day.   ascorbic acid (VITAMIN C ORAL) 10/1/2024 at am Child Yes Yes   Sig: Take 1 capsule by mouth once daily.   b complex-vitamin c tablet 10/1/2024 at am Child Yes Yes   Sig: Take 1 tablet by mouth once daily.   cholecalciferol (Vitamin D-3) 50 mcg (2,000 unit) capsule 10/1/2024 at am Child Yes Yes   Sig: Take 1 capsule (50 mcg) by mouth early in the morning..   cyanocobalamin, vitamin B-12, 3,000 mcg tablet, sublingual 10/1/2024 at am Child Yes Yes   Sig: Take 1 tablet by mouth once daily.   escitalopram (Lexapro) 20 mg tablet 10/1/2024 at am Child No Yes   Sig: TAKE 1 & 1/2 TABLETS BY MOUTH EVERY DAY   loperamide (Imodium A-D) 2 mg tablet Past Week Child Yes Yes   Sig: Take 1 tablet (2 mg) by mouth if needed.   meclizine (Antivert) 25 mg tablet Unknown Child Yes Unknown   Sig: Take 1 tablet (25 mg) by mouth once daily as needed for dizziness.   mv-min-folic-K1-lycopen-lutein (Centrum Silver Men) 754--300 mcg tablet Past Week Child Yes Yes   Sig: Take by mouth once daily.   primidone (Mysoline) 50 mg tablet 9/30/2024 Child No Yes   Sig: Take 1 tablet (50 mg) by mouth once daily at bedtime.   rosuvastatin (Crestor) 10 mg " tablet 10/1/2024 at am Child Yes Yes   Sig: Take 1 tablet (10 mg) by mouth once daily.   sulfamethoxazole-trimethoprim (Bactrim DS) 800-160 mg tablet 10/1/2024 at pm Child No Yes   Sig: Take 1 tablet by mouth every 12 hours for 5 days.      Facility-Administered Medications: None           The list below reflectives the updated allergy list. Please review each documented allergy for additional clarification and justification.  Allergies  Reviewed by Hiro Frias RN on 10/2/2024   No Known Allergies         Below are additional concerns with the patient's PTA list.      Nisreen Bailey

## 2024-10-02 NOTE — ASSESSMENT & PLAN NOTE
-May be secondary to acute CHF vs. Infectious/viral infection  -, CXR with vascular congestion  -Afebrile but does feel warm on exam. No leukocytosis. Negative for Influenza/COVID  -Will continue with gentle diuresis and maintain CHF precautions: 2gm sodium diet, fluid restriction; strict I&O's  -Consult Cardiology - established patient of Dr. Aguirre  -Recent Echocardiogram (9/4/24): LVEF 60%, mild aortic stenosis  -Will diuresis with IV Lasix 40mg BID  -Add antibiotics for CAP until infectious process ruled out - Ceftriaxone, Azithromycin  -Check procalcitonin  -Added duonebs  -Wean oxygen as tolerates  -CBC in AM.     CV: HTN, HFpEF, PAF, s/p PPM  -Troponin elevation: 38>43  -Suspect demand ischemia in setting of acute hypoxic respiratory failure  -Cardiology consulted.   -Monitor on telemetry  -On Apixaban     Dizziness  -Has history of vertigo  -Per recent cardiology note was found to be hypotensive and hypovolemic  -Will monitor on telemetry  -On Meclizine PRN    Depression  -On Escitalopram    DVT Prophylaxis  -On Apixaban (A.Fib)    Hypomagnesia  -Replaced in ED  -Repeat level in AM.

## 2024-10-02 NOTE — H&P
"History Of Present Illness  Garrison Rodriguez \"Porfirio" is a 89 y.o. male with a medical history of HTN, PAF, and Aortic Stenosis who presented to [facility] ED with c/o weakness, dizziness, and shortness of breath. States was in his usual state of health when went to bed last PM but when woke this AM, he felt dizzy, with lightheadedness, weakness, and inability to stand up. He also had episode of urinary incontinence. He denies falling or LOC, headache, visual changes, chest pain, palpitations, cough, breathing difficulties, fever, or chills. He was evaluated in this ED on 9/30/24 for abdominal pain. He was treated with antibiotics for a presumptive UTI with Bactrim. Urine culture collected on that date is negative. He denies acute abdominal pain at this time but does have some diarrhea. In the ED, T. 36.3, HR 77, RR 20, /70, Pox 94%. Glucose 129, Sodium 133, Potassium 3.9, BUN 19, Creatinine 1.44. WBC 8.8, Hgb 12.1, Plts 152. . Troponin 38>43. Magnesium 1.38. CT Head negative for acute findings. No PE on CT Angio. CXR with some vascular congestion. Given IV Lasix 20mg x 1, magnesium supplementation, and 1 liter fluid bolus. Being admitted for further workup.     Past Medical History  Hypertension  Hyperlipidemia  Atrial Fibrillation - Paroxysmal  Aortic Stenosis - Mild  Mitral/Tricuspid Regurgitation - Mild  Bradycardia  Chronic Venous Insufficiency  TIA  GIORGI  Depression  GERD  Essential Tremor  Hemangioma  Breast Cancer  Colon Cancer  Posthepatic Encephalopathy  Osteoarthritis  Lumbar Spondylosis  Vertigo    Surgical History  Left Breast Lumpectomy  Cholecystectomy   Tonsillectomy  Back Surgery  Total Knee Arthroscopy  EGD/Colonoscopy    Social History  He reports that he has never smoked. He has never been exposed to tobacco smoke. He has never used smokeless tobacco. He reports that he does not drink alcohol and does not use drugs.  . Uses cane for ambulation.    Family History  CAD, CHF   " "  Allergies  Patient has no known allergies.    Review of Systems  Constitutional: Denies fever, chills, + weakness/fatigue  HEENT: Denies ear ache, sore throat, nasal drainage  Eyes: Denies blurred or double vision  Respiratory: Denies cough, shortness or breath, wheezing  Cardiovascular: Denies chest pain, palpitations, shortness of breath with exertion, edema  GI: See HPI   : Denies urinary burning, urgency, frequency, hematuria  Musculoskeletal: Denies back, neck, or joint pain; joint redness, swelling, or tenderness  Endocrine: Denies cold/heat intolerance, excessive thirst, excessive hunger  Neuro: See HPI  Psychiatric: Denies anxiety, depression, self-harm  Skin: Denies wounds, rashes, lesions  Hematologic: Denies easy bruising, easy bleeding, clotting disorder      Physical Exam  Constitutional: A&O x 3; NAD; calm and cooperative  Eyes: EOM's intact  HEENT: Normocephalic, Atraumatic. Oral mucosa moist.   Neck: Supple. + JVD, lymphadenopathy.   Lungs: CTAB with a few bibasilar crackles. Respirations even and unlabored on 2 liters.   Heart: RRR; + murmur.  Abdomen: Softly distended, non-tender, +BS  MS/Extremities: MORGAN equally x 4. No edema. Peripheral pulses intact bilaterally. Chronic vascular changes to lower extremities.   Neuro: A&O x3; no focal deficits; gross motor and sensation intact.   Skin: Warm and dry. No rashes or lesions  Psych: Normal affect.       Last Recorded Vitals  Blood pressure 106/59, pulse 61, temperature 36.3 °C (97.3 °F), temperature source Temporal, resp. rate 17, height 1.778 m (5' 10\"), weight 90.7 kg (200 lb), SpO2 98%.    Relevant Results  Results for orders placed or performed during the hospital encounter of 10/02/24 (from the past 24 hour(s))   ECG 12 lead   Result Value Ref Range    Ventricular Rate 76 BPM    Atrial Rate 76 BPM    ME Interval 190 ms    QRS Duration 160 ms    QT Interval 424 ms    QTC Calculation(Bazett) 477 ms    P Axis 34 degrees    R Axis -85 degrees    " T Axis 42 degrees    QRS Count 12 beats    Q Onset 191 ms    P Onset 96 ms    P Offset 155 ms    T Offset 403 ms    QTC Fredericia 458 ms   CBC and Auto Differential   Result Value Ref Range    WBC 8.8 4.4 - 11.3 x10*3/uL    nRBC 0.0 0.0 - 0.0 /100 WBCs    RBC 3.99 (L) 4.50 - 5.90 x10*6/uL    Hemoglobin 12.1 (L) 13.5 - 17.5 g/dL    Hematocrit 35.3 (L) 41.0 - 52.0 %    MCV 89 80 - 100 fL    MCH 30.3 26.0 - 34.0 pg    MCHC 34.3 32.0 - 36.0 g/dL    RDW 13.2 11.5 - 14.5 %    Platelets 152 150 - 450 x10*3/uL    Neutrophils % 91.7 40.0 - 80.0 %    Immature Granulocytes %, Automated 0.6 0.0 - 0.9 %    Lymphocytes % 1.8 13.0 - 44.0 %    Monocytes % 5.5 2.0 - 10.0 %    Eosinophils % 0.1 0.0 - 6.0 %    Basophils % 0.3 0.0 - 2.0 %    Neutrophils Absolute 8.10 (H) 1.60 - 5.50 x10*3/uL    Immature Granulocytes Absolute, Automated 0.05 0.00 - 0.50 x10*3/uL    Lymphocytes Absolute 0.16 (L) 0.80 - 3.00 x10*3/uL    Monocytes Absolute 0.49 0.05 - 0.80 x10*3/uL    Eosinophils Absolute 0.01 0.00 - 0.40 x10*3/uL    Basophils Absolute 0.03 0.00 - 0.10 x10*3/uL   Magnesium   Result Value Ref Range    Magnesium 1.38 (L) 1.60 - 2.40 mg/dL   Comprehensive metabolic panel   Result Value Ref Range    Glucose 129 (H) 74 - 99 mg/dL    Sodium 133 (L) 136 - 145 mmol/L    Potassium 3.9 3.5 - 5.3 mmol/L    Chloride 100 98 - 107 mmol/L    Bicarbonate 25 21 - 32 mmol/L    Anion Gap 12 10 - 20 mmol/L    Urea Nitrogen 19 6 - 23 mg/dL    Creatinine 1.44 (H) 0.50 - 1.30 mg/dL    eGFR 46 (L) >60 mL/min/1.73m*2    Calcium 8.8 8.6 - 10.3 mg/dL    Albumin 3.7 3.4 - 5.0 g/dL    Alkaline Phosphatase 60 33 - 136 U/L    Total Protein 6.9 6.4 - 8.2 g/dL    AST 18 9 - 39 U/L    Bilirubin, Total 0.6 0.0 - 1.2 mg/dL    ALT 10 10 - 52 U/L   Lactate   Result Value Ref Range    Lactate 1.4 0.4 - 2.0 mmol/L   Protime-INR   Result Value Ref Range    Protime 15.8 (H) 9.8 - 12.8 seconds    INR 1.4 (H) 0.9 - 1.1   Troponin I, High Sensitivity   Result Value Ref Range     Troponin I, High Sensitivity 38 (H) 0 - 20 ng/L   B-Type Natriuretic Peptide   Result Value Ref Range     (H) 0 - 99 pg/mL   Type And Screen   Result Value Ref Range    ABO TYPE O     Rh TYPE POS     ANTIBODY SCREEN NEG    Sars-CoV-2 PCR   Result Value Ref Range    Coronavirus 2019, PCR Not Detected Not Detected   Influenza A, and B PCR   Result Value Ref Range    Flu A Result Not Detected Not Detected    Flu B Result Not Detected Not Detected   Blood Gas Venous Full Panel   Result Value Ref Range    POCT pH, Venous 7.39 7.33 - 7.43 pH    POCT pCO2, Venous 43 41 - 51 mm Hg    POCT pO2, Venous 33 (L) 35 - 45 mm Hg    POCT SO2, Venous 59 45 - 75 %    POCT Oxy Hemoglobin, Venous 57.3 45.0 - 75.0 %    POCT Hematocrit Calculated, Venous 41.0 41.0 - 52.0 %    POCT Sodium, Venous 132 (L) 136 - 145 mmol/L    POCT Potassium, Venous 3.9 3.5 - 5.3 mmol/L    POCT Chloride, Venous 100 98 - 107 mmol/L    POCT Ionized Calicum, Venous 1.15 1.10 - 1.33 mmol/L    POCT Glucose, Venous 115 (H) 74 - 99 mg/dL    POCT Lactate, Venous 1.7 0.4 - 2.0 mmol/L    POCT Base Excess, Venous 0.8 -2.0 - 3.0 mmol/L    POCT HCO3 Calculated, Venous 26.0 22.0 - 26.0 mmol/L    POCT Hemoglobin, Venous 13.6 13.5 - 17.5 g/dL    POCT Anion Gap, Venous 10.0 10.0 - 25.0 mmol/L    Patient Temperature      FiO2 32 %   Troponin I, High Sensitivity   Result Value Ref Range    Troponin I, High Sensitivity 43 (H) 0 - 20 ng/L   Urinalysis with Reflex Culture and Microscopic   Result Value Ref Range    Color, Urine Colorless (N) Light-Yellow, Yellow, Dark-Yellow    Appearance, Urine Clear Clear    Specific Gravity, Urine 1.015 1.005 - 1.035    pH, Urine 5.0 5.0, 5.5, 6.0, 6.5, 7.0, 7.5, 8.0    Protein, Urine NEGATIVE NEGATIVE, 10 (TRACE), 20 (TRACE) mg/dL    Glucose, Urine Normal Normal mg/dL    Blood, Urine NEGATIVE NEGATIVE    Ketones, Urine NEGATIVE NEGATIVE mg/dL    Bilirubin, Urine NEGATIVE NEGATIVE    Urobilinogen, Urine Normal Normal mg/dL    Nitrite,  Urine NEGATIVE NEGATIVE    Leukocyte Esterase, Urine NEGATIVE NEGATIVE      CT Head:  Impression:     No evidence of acute cortical infarct or intracranial hemorrhage.      Mild age-related chronic changes.     CT Angio Chest for Pulmonary Embolism:  Impression:     No CT evidence of pulmonary embolism in the current exam.      Evidence of prior granulomatous disease vs. Prior asbestos exposure.  Clinical correlation needed.      Small hiatal hernia.      Cardiomegaly.      Thoracic spine DJD. Slight anterior wedge deformities from T6 through  T8, most likely remote.      Mild aneurysmal dilatation of the ascending aorta as described.     CXR:   Impression:     Hypoventilatory exam.      Bilateral calcified pleural plaques as described.      Stable cardiac pacemaker on the left.      Cardiomegaly with borderline central vascular congestion.        Assessment/Plan   89 y.o. male with a medical history of HTN, PAF, and Aortic Stenosis who presented to [facility] ED with c/o weakness, dizziness, and shortness of breath. In the ED, . Troponin 38>43. Magnesium 1.38. CT Head negative for acute findings. No PE on CT Angio. CXR with some vascular congestion. Admitted for acute hypoxic respiratory failure.   Assessment & Plan  Acute respiratory failure with hypoxia (Multi)  -May be secondary to acute CHF vs. Infectious/viral infection  -, CXR with vascular congestion  -Afebrile but does feel warm on exam. No leukocytosis. Negative for Influenza/COVID  -Will continue with gentle diuresis and maintain CHF precautions: 2gm sodium diet, fluid restriction; strict I&O's  -Consult Cardiology - established patient of Dr. Aguirre  -Recent Echocardiogram (9/4/24): LVEF 60%, mild aortic stenosis  -Will diuresis with IV Lasix 40mg BID  -Add antibiotics for CAP until infectious process ruled out - Ceftriaxone, Azithromycin  -Check procalcitonin  -Added duonebs  -Wean oxygen as tolerates  -CBC in AM.     CV: HTN, HFpEF, PAF, s/p  PPM  -Troponin elevation: 38>43  -Suspect demand ischemia in setting of acute hypoxic respiratory failure  -Cardiology consulted.   -Monitor on telemetry  -On Apixaban     Dizziness  -Has history of vertigo  -Per recent cardiology note was found to be hypotensive and hypovolemic  -Will monitor on telemetry  -On Meclizine PRN    Depression  -On Escitalopram    DVT Prophylaxis  -On Apixaban (A.Fib)    Hypomagnesia  -Replaced in ED  -Repeat level in AM.     Disposition  -Plan of care discussed with attending, Dr. Joyner.   -Daughter at bedside and updated on plan of care.   -Full code for now.       Rhianna Andersen, APRN-CNP

## 2024-10-02 NOTE — PROGRESS NOTES
10/02/24 1602   Discharge Planning   Living Arrangements Children  (in law suite at daughter Hetal's home)   Support Systems Children   Assistance Needed A&OX4 (alert to name, location, situation and month); independent with ADLs with walker and cane; drives; room air baseline - currently on 2L NC; on Eliquis prior to hospitalization   Type of Residence Private residence   Number of Stairs to Enter Residence 3   Number of Stairs Within Residence 14   Do you have animals or pets at home? Yes   Type of Animals or Pets daughter has 1 dog   Who is requesting discharge planning? Provider   Home or Post Acute Services None   Expected Discharge Disposition Home  (Pending workup and therapy evals. DC dispo pending)   Does the patient need discharge transport arranged? Yes   RoundTrip coordination needed? Yes   Has discharge transport been arranged? No   Financial Resource Strain   How hard is it for you to pay for the very basics like food, housing, medical care, and heating? Not hard   Housing Stability   In the last 12 months, was there a time when you were not able to pay the mortgage or rent on time? N   In the past 12 months, how many times have you moved where you were living? 1   At any time in the past 12 months, were you homeless or living in a shelter (including now)? N   Transportation Needs   In the past 12 months, has lack of transportation kept you from medical appointments or from getting medications? no   In the past 12 months, has lack of transportation kept you from meetings, work, or from getting things needed for daily living? No     10/02/2024 1605pm  Spoke with patient and patient's daughter bedside in ED

## 2024-10-03 LAB
ALBUMIN SERPL BCP-MCNC: 3.5 G/DL (ref 3.4–5)
ALP SERPL-CCNC: 59 U/L (ref 33–136)
ALT SERPL W P-5'-P-CCNC: 12 U/L (ref 10–52)
ANION GAP SERPL CALC-SCNC: 11 MMOL/L (ref 10–20)
AST SERPL W P-5'-P-CCNC: 21 U/L (ref 9–39)
ATRIAL RATE: 76 BPM
BASOPHILS # BLD AUTO: 0.03 X10*3/UL (ref 0–0.1)
BASOPHILS NFR BLD AUTO: 0.4 %
BILIRUB SERPL-MCNC: 0.5 MG/DL (ref 0–1.2)
BUN SERPL-MCNC: 20 MG/DL (ref 6–23)
CALCIUM SERPL-MCNC: 8.5 MG/DL (ref 8.6–10.3)
CHLORIDE SERPL-SCNC: 95 MMOL/L (ref 98–107)
CO2 SERPL-SCNC: 30 MMOL/L (ref 21–32)
CREAT SERPL-MCNC: 1.7 MG/DL (ref 0.5–1.3)
EGFRCR SERPLBLD CKD-EPI 2021: 38 ML/MIN/1.73M*2
EOSINOPHIL # BLD AUTO: 0.02 X10*3/UL (ref 0–0.4)
EOSINOPHIL NFR BLD AUTO: 0.3 %
ERYTHROCYTE [DISTWIDTH] IN BLOOD BY AUTOMATED COUNT: 13.5 % (ref 11.5–14.5)
GLUCOSE SERPL-MCNC: 115 MG/DL (ref 74–99)
HCT VFR BLD AUTO: 40 % (ref 41–52)
HGB BLD-MCNC: 13.5 G/DL (ref 13.5–17.5)
HOLD SPECIMEN: NORMAL
IMM GRANULOCYTES # BLD AUTO: 0.04 X10*3/UL (ref 0–0.5)
IMM GRANULOCYTES NFR BLD AUTO: 0.6 % (ref 0–0.9)
LYMPHOCYTES # BLD AUTO: 0.34 X10*3/UL (ref 0.8–3)
LYMPHOCYTES NFR BLD AUTO: 4.9 %
MAGNESIUM SERPL-MCNC: 1.73 MG/DL (ref 1.6–2.4)
MCH RBC QN AUTO: 29.8 PG (ref 26–34)
MCHC RBC AUTO-ENTMCNC: 33.8 G/DL (ref 32–36)
MCV RBC AUTO: 88 FL (ref 80–100)
MONOCYTES # BLD AUTO: 0.82 X10*3/UL (ref 0.05–0.8)
MONOCYTES NFR BLD AUTO: 11.8 %
NEUTROPHILS # BLD AUTO: 5.68 X10*3/UL (ref 1.6–5.5)
NEUTROPHILS NFR BLD AUTO: 82 %
NRBC BLD-RTO: 0 /100 WBCS (ref 0–0)
P AXIS: 34 DEGREES
P OFFSET: 155 MS
P ONSET: 96 MS
PLATELET # BLD AUTO: 144 X10*3/UL (ref 150–450)
POTASSIUM SERPL-SCNC: 3.6 MMOL/L (ref 3.5–5.3)
PR INTERVAL: 190 MS
PROCALCITONIN SERPL-MCNC: 0.27 NG/ML
PROT SERPL-MCNC: 6.6 G/DL (ref 6.4–8.2)
Q ONSET: 191 MS
QRS COUNT: 12 BEATS
QRS DURATION: 160 MS
QT INTERVAL: 424 MS
QTC CALCULATION(BAZETT): 477 MS
QTC FREDERICIA: 458 MS
R AXIS: -85 DEGREES
RBC # BLD AUTO: 4.53 X10*6/UL (ref 4.5–5.9)
SODIUM SERPL-SCNC: 132 MMOL/L (ref 136–145)
T AXIS: 42 DEGREES
T OFFSET: 403 MS
VENTRICULAR RATE: 76 BPM
WBC # BLD AUTO: 6.9 X10*3/UL (ref 4.4–11.3)

## 2024-10-03 PROCEDURE — 36415 COLL VENOUS BLD VENIPUNCTURE: CPT | Performed by: NURSE PRACTITIONER

## 2024-10-03 PROCEDURE — 1200000002 HC GENERAL ROOM WITH TELEMETRY DAILY

## 2024-10-03 PROCEDURE — 85025 COMPLETE CBC W/AUTO DIFF WBC: CPT | Performed by: STUDENT IN AN ORGANIZED HEALTH CARE EDUCATION/TRAINING PROGRAM

## 2024-10-03 PROCEDURE — 2500000004 HC RX 250 GENERAL PHARMACY W/ HCPCS (ALT 636 FOR OP/ED): Performed by: NURSE PRACTITIONER

## 2024-10-03 PROCEDURE — 99232 SBSQ HOSP IP/OBS MODERATE 35: CPT | Performed by: STUDENT IN AN ORGANIZED HEALTH CARE EDUCATION/TRAINING PROGRAM

## 2024-10-03 PROCEDURE — 2500000002 HC RX 250 W HCPCS SELF ADMINISTERED DRUGS (ALT 637 FOR MEDICARE OP, ALT 636 FOR OP/ED): Performed by: NURSE PRACTITIONER

## 2024-10-03 PROCEDURE — 84075 ASSAY ALKALINE PHOSPHATASE: CPT | Performed by: STUDENT IN AN ORGANIZED HEALTH CARE EDUCATION/TRAINING PROGRAM

## 2024-10-03 PROCEDURE — 84145 PROCALCITONIN (PCT): CPT | Mod: GEALAB | Performed by: NURSE PRACTITIONER

## 2024-10-03 PROCEDURE — 83735 ASSAY OF MAGNESIUM: CPT | Performed by: NURSE PRACTITIONER

## 2024-10-03 PROCEDURE — 2500000001 HC RX 250 WO HCPCS SELF ADMINISTERED DRUGS (ALT 637 FOR MEDICARE OP): Performed by: NURSE PRACTITIONER

## 2024-10-03 PROCEDURE — 2500000001 HC RX 250 WO HCPCS SELF ADMINISTERED DRUGS (ALT 637 FOR MEDICARE OP): Performed by: STUDENT IN AN ORGANIZED HEALTH CARE EDUCATION/TRAINING PROGRAM

## 2024-10-03 RX ORDER — AZITHROMYCIN 500 MG/1
500 TABLET, FILM COATED ORAL
Status: COMPLETED | OUTPATIENT
Start: 2024-10-03 | End: 2024-10-04

## 2024-10-03 ASSESSMENT — COGNITIVE AND FUNCTIONAL STATUS - GENERAL
WALKING IN HOSPITAL ROOM: A LITTLE
CLIMB 3 TO 5 STEPS WITH RAILING: A LITTLE
MOVING TO AND FROM BED TO CHAIR: A LITTLE
TOILETING: A LITTLE
DAILY ACTIVITIY SCORE: 20
WALKING IN HOSPITAL ROOM: A LITTLE
HELP NEEDED FOR BATHING: A LITTLE
TOILETING: A LITTLE
HELP NEEDED FOR BATHING: A LITTLE
DRESSING REGULAR LOWER BODY CLOTHING: A LITTLE
DAILY ACTIVITIY SCORE: 20
CLIMB 3 TO 5 STEPS WITH RAILING: A LITTLE
STANDING UP FROM CHAIR USING ARMS: A LITTLE
MOBILITY SCORE: 20
DRESSING REGULAR LOWER BODY CLOTHING: A LITTLE
DRESSING REGULAR UPPER BODY CLOTHING: A LITTLE
MOVING TO AND FROM BED TO CHAIR: A LITTLE
MOBILITY SCORE: 20
DRESSING REGULAR UPPER BODY CLOTHING: A LITTLE
STANDING UP FROM CHAIR USING ARMS: A LITTLE

## 2024-10-03 ASSESSMENT — PAIN SCALES - GENERAL
PAINLEVEL_OUTOF10: 0 - NO PAIN
PAINLEVEL_OUTOF10: 0 - NO PAIN

## 2024-10-03 ASSESSMENT — PAIN - FUNCTIONAL ASSESSMENT
PAIN_FUNCTIONAL_ASSESSMENT: 0-10

## 2024-10-03 ASSESSMENT — ENCOUNTER SYMPTOMS
DIZZINESS: 1
COUGH: 0
WEAKNESS: 1
ABDOMINAL DISTENTION: 0
CHILLS: 0
FEVER: 0
SHORTNESS OF BREATH: 0
PALPITATIONS: 0
FATIGUE: 1
ABDOMINAL PAIN: 0

## 2024-10-03 NOTE — NURSING NOTE
1115: [employer] rep called to say pacemaker interrogation complete. Results received by fax and placed in chart, provider notified.

## 2024-10-03 NOTE — CONSULTS
"Inpatient consult to Cardiology  Consult performed by: NICOLE Kendall-CNP  Consult ordered by: HI Franco  Reason for consult: hypoxia          History Of Present Illness  Garrison Rodriguez \"Porfirio" is a 89 y.o. male presenting with complaints of worsening dizziness, fatigue and weakness. He denies any chest pain, shortness of breath, or palpitations. His daughter at the bedside helped provide history. She states he has been feeling well and got his COVID and flu vaccine then the following day had worsening dizziness and weakness. He then had an episode of urinary incontinence. He does report not drinking enough most days. She had to call EMS because she could not get him to the car. He had been seen in the ER a few days prior with concerns for abd pain and was treated for a UTI.     Lab work in the ER showed glucose 129, sodium 133, potassium 3.9, BUN/Cr 19/1.44, magnesium 1.38, , troponin 38, INR 1.4, WBC 8.8, H&H 12.1/35.3. CXR showed cardiomegaly with borderline central vascular congestion. CTA showed cardiomegaly, mild aneurysmal dilatation of the ascending aorta, evidence of prior granulomatous disease vs prior asbestos exposure.     EKG showed ventricular paced.    He was given an IVF bolus and then IV lasix and admitted for care.       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    Post-herpetic polyneuropathy    Spondylosis, unspecified 10/12/2016    Degenerative arthritis of spine       Surgical History  Past Surgical History:   Procedure Laterality Date    BREAST LUMPECTOMY  07/15/2016    Left " Breast Lumpectomy    OTHER SURGICAL HISTORY  05/18/2022    Gallbladder surgery    OTHER SURGICAL HISTORY  05/18/2022    Heart surgery    OTHER SURGICAL HISTORY  05/18/2022    Back surgery    OTHER SURGICAL HISTORY  04/15/2020    Cholecystectomy with cholangiography    OTHER SURGICAL HISTORY  04/15/2020    Esophagogastroduodenoscopy    TONSILLECTOMY  06/25/2018    Tonsillectomy    TOTAL KNEE ARTHROPLASTY  01/04/2023    Total Knee Arthroplasty        Social History  He reports that he has never smoked. He has never been exposed to tobacco smoke. He has never used smokeless tobacco. He reports that he does not drink alcohol and does not use drugs.    Family History  No family history on file.     Allergies  Patient has no known allergies.    Review of Systems  Review of Systems   Constitutional:  Positive for fatigue. Negative for chills and fever.   Respiratory:  Negative for cough and shortness of breath.    Cardiovascular:  Negative for chest pain, palpitations and leg swelling.   Gastrointestinal:  Negative for abdominal distention and abdominal pain.   Neurological:  Positive for dizziness and weakness.   All other systems reviewed and are negative.         Physical Exam  Constitutional: Well developed, awake/alert/oriented x3, no distress, alert and cooperative  Eyes: PERRL, EOMI, clear sclera  ENMT: mucous membranes moist, no apparent injury, no lesions seen  Head/Neck: Neck supple, no apparent injury, thyroid without mass or tenderness, No JVD, trachea midline, no bruits  Respiratory/Thorax: Patent airways, CTAB, normal breath sounds with good chest expansion, thorax symmetric  Cardiovascular: Regular, rate and rhythm, no murmurs, 2+ equal pulses of the extremities, normal S 1and S 2  Gastrointestinal: Nondistended, soft, non-tender, no rebound tenderness or guarding, no masses palpable, no organomegaly, +BS, no bruits  Musculoskeletal: ROM intact, no joint swelling, normal strength  Extremities: normal  "extremities, no cyanosis edema, contusions or wounds, no clubbing  Neurological: alert and oriented x3, intact senses, motor, response and reflexes, normal strength  Lymphatic: No significant lymphadenopathy  Psychological: Appropriate mood and behavior  Skin: Warm and dry, no lesions, no rashes       Last Recorded Vitals  Blood pressure 92/63, pulse 78, temperature 37.1 °C (98.8 °F), temperature source Temporal, resp. rate 20, height 1.778 m (5' 10\"), weight 90.7 kg (200 lb), SpO2 94%.    Medications  Scheduled medications  apixaban, 5 mg, oral, BID  azithromycin, 500 mg, oral, q24h MEDARDO  cefTRIAXone, 1 g, intravenous, q24h  cholecalciferol, 2,000 Units, oral, Daily  docusate sodium, 100 mg, oral, BID  escitalopram, 20 mg, oral, Daily  [Held by provider] furosemide, 40 mg, intravenous, q12h  primidone, 50 mg, oral, Nightly  rosuvastatin, 10 mg, oral, Daily  sodium chloride, 500 mL, intravenous, Once      Continuous medications     PRN medications  PRN medications: acetaminophen, ipratropium-albuteroL, loperamide, meclizine, melatonin, ondansetron **OR** ondansetron    Relevant Results  Results for orders placed or performed during the hospital encounter of 10/02/24 (from the past 24 hour(s))   Sars-CoV-2 PCR   Result Value Ref Range    Coronavirus 2019, PCR Not Detected Not Detected   Influenza A, and B PCR   Result Value Ref Range    Flu A Result Not Detected Not Detected    Flu B Result Not Detected Not Detected   Blood Gas Venous Full Panel   Result Value Ref Range    POCT pH, Venous 7.39 7.33 - 7.43 pH    POCT pCO2, Venous 43 41 - 51 mm Hg    POCT pO2, Venous 33 (L) 35 - 45 mm Hg    POCT SO2, Venous 59 45 - 75 %    POCT Oxy Hemoglobin, Venous 57.3 45.0 - 75.0 %    POCT Hematocrit Calculated, Venous 41.0 41.0 - 52.0 %    POCT Sodium, Venous 132 (L) 136 - 145 mmol/L    POCT Potassium, Venous 3.9 3.5 - 5.3 mmol/L    POCT Chloride, Venous 100 98 - 107 mmol/L    POCT Ionized Calicum, Venous 1.15 1.10 - 1.33 mmol/L    " POCT Glucose, Venous 115 (H) 74 - 99 mg/dL    POCT Lactate, Venous 1.7 0.4 - 2.0 mmol/L    POCT Base Excess, Venous 0.8 -2.0 - 3.0 mmol/L    POCT HCO3 Calculated, Venous 26.0 22.0 - 26.0 mmol/L    POCT Hemoglobin, Venous 13.6 13.5 - 17.5 g/dL    POCT Anion Gap, Venous 10.0 10.0 - 25.0 mmol/L    Patient Temperature      FiO2 32 %   Troponin I, High Sensitivity   Result Value Ref Range    Troponin I, High Sensitivity 43 (H) 0 - 20 ng/L   Urinalysis with Reflex Culture and Microscopic   Result Value Ref Range    Color, Urine Colorless (N) Light-Yellow, Yellow, Dark-Yellow    Appearance, Urine Clear Clear    Specific Gravity, Urine 1.015 1.005 - 1.035    pH, Urine 5.0 5.0, 5.5, 6.0, 6.5, 7.0, 7.5, 8.0    Protein, Urine NEGATIVE NEGATIVE, 10 (TRACE), 20 (TRACE) mg/dL    Glucose, Urine Normal Normal mg/dL    Blood, Urine NEGATIVE NEGATIVE    Ketones, Urine NEGATIVE NEGATIVE mg/dL    Bilirubin, Urine NEGATIVE NEGATIVE    Urobilinogen, Urine Normal Normal mg/dL    Nitrite, Urine NEGATIVE NEGATIVE    Leukocyte Esterase, Urine NEGATIVE NEGATIVE   Extra Urine Gray Tube   Result Value Ref Range    Extra Tube Hold for add-ons.    Troponin I, High Sensitivity   Result Value Ref Range    Troponin I, High Sensitivity 47 (H) 0 - 20 ng/L   Comprehensive Metabolic Panel   Result Value Ref Range    Glucose 115 (H) 74 - 99 mg/dL    Sodium 132 (L) 136 - 145 mmol/L    Potassium 3.6 3.5 - 5.3 mmol/L    Chloride 95 (L) 98 - 107 mmol/L    Bicarbonate 30 21 - 32 mmol/L    Anion Gap 11 10 - 20 mmol/L    Urea Nitrogen 20 6 - 23 mg/dL    Creatinine 1.70 (H) 0.50 - 1.30 mg/dL    eGFR 38 (L) >60 mL/min/1.73m*2    Calcium 8.5 (L) 8.6 - 10.3 mg/dL    Albumin 3.5 3.4 - 5.0 g/dL    Alkaline Phosphatase 59 33 - 136 U/L    Total Protein 6.6 6.4 - 8.2 g/dL    AST 21 9 - 39 U/L    Bilirubin, Total 0.5 0.0 - 1.2 mg/dL    ALT 12 10 - 52 U/L   CBC and Auto Differential   Result Value Ref Range    WBC 6.9 4.4 - 11.3 x10*3/uL    nRBC 0.0 0.0 - 0.0 /100 WBCs     RBC 4.53 4.50 - 5.90 x10*6/uL    Hemoglobin 13.5 13.5 - 17.5 g/dL    Hematocrit 40.0 (L) 41.0 - 52.0 %    MCV 88 80 - 100 fL    MCH 29.8 26.0 - 34.0 pg    MCHC 33.8 32.0 - 36.0 g/dL    RDW 13.5 11.5 - 14.5 %    Platelets 144 (L) 150 - 450 x10*3/uL    Neutrophils % 82.0 40.0 - 80.0 %    Immature Granulocytes %, Automated 0.6 0.0 - 0.9 %    Lymphocytes % 4.9 13.0 - 44.0 %    Monocytes % 11.8 2.0 - 10.0 %    Eosinophils % 0.3 0.0 - 6.0 %    Basophils % 0.4 0.0 - 2.0 %    Neutrophils Absolute 5.68 (H) 1.60 - 5.50 x10*3/uL    Immature Granulocytes Absolute, Automated 0.04 0.00 - 0.50 x10*3/uL    Lymphocytes Absolute 0.34 (L) 0.80 - 3.00 x10*3/uL    Monocytes Absolute 0.82 (H) 0.05 - 0.80 x10*3/uL    Eosinophils Absolute 0.02 0.00 - 0.40 x10*3/uL    Basophils Absolute 0.03 0.00 - 0.10 x10*3/uL   Magnesium   Result Value Ref Range    Magnesium 1.73 1.60 - 2.40 mg/dL       CT head wo IV contrast   Final Result   No evidence of acute cortical infarct or intracranial hemorrhage.        Mild age-related chronic changes.        MACRO:   None        Signed by: Jose Roberto Rashid 10/2/2024 3:19 PM   Dictation workstation:   [id number]      CT angio chest for pulmonary embolism   Final Result   No CT evidence of pulmonary embolism in the current exam.        Evidence of prior granulomatous disease vs. Prior asbestos exposure.   Clinical correlation needed.        Small hiatal hernia.        Cardiomegaly.        Thoracic spine DJD. Slight anterior wedge deformities from T6 through   T8, most likely remote.        Mild aneurysmal dilatation of the ascending aorta as described.        MACRO:   None        Signed by: Parminder George 10/2/2024 2:21 PM   Dictation workstation:   [id number]      XR chest 1 view   Final Result   Hypoventilatory exam.        Bilateral calcified pleural plaques as described.        Stable cardiac pacemaker on the left.        Cardiomegaly with borderline central vascular congestion.        MACRO:   None         Signed by: Parminder George 10/2/2024 12:38 PM   Dictation workstation:   [id number]             Assessment/Plan   Echo from Sept 4, 2024: LVEF 60%, mild AS/MR/TR, LVH and abnormal LV relaxation    Dizziness, fatigue  -Had 2 vaccines the other day, poor PO intake  -CT head negative  -Orthostatic VS positive  -EKG showed ventricular paced  -Pacemaker personally interrogated, normal functioning, no arrhythmias  -Magnesium 1.3->supplemented  -CXR showed cardiomegaly with mild vascular congestion  -CTA negative for PE, no effusions or edema  -Given IVF bolus in ER then IV lasix  -Creatinine up to 1.7 today  -Agree with IVF bolus today    2. Elevated troponin-Non MI elevation  -Denies ACS symptoms  -EKG V paced, unable to assess for ischemia  -Echo as above  -Cont statin    3. Hyperlipidemia  -Cont statin    4. Acute kidney injury  -Creatinine up to 1.7  -Most likely due to dehydration  -Agree with IVF bolus  -Monitor    5. Paroxysmal atrial fibrillation  -Currently v paced  -Cont eliquis for CVA prevention  -Not on rate control meds  -Monitor on tele      Joanne Randhawa, NICOLE-CNP

## 2024-10-03 NOTE — PROGRESS NOTES
Patient: Garrison Rodriguez  Room/bed: 112/112-A  Admitted on: 10/2/2024    Age: 89 y.o.   Gender: male  Code Status:  Full Code   Admitting Dx: Acute respiratory failure with hypoxia (Multi) [J96.01]  Generalized weakness [R53.1]  Acute on chronic heart failure, unspecified heart failure type [I50.9]    MRN: [id number]  PCP: HI Fernandez       Subjective   Seen and examined in his room this AM. Up in chair. Feels better but still not well. Dizzy when OOB with positive orthostatics. He denies chest pain, breathing difficulties, abdominal pain, N/V/D/C, fever, or chills.  Daughter Hetal is at bedside and she reports that he did receive a COVID and Influenza vaccines Tuesday afternoon.     Objective    Physical Exam   Constitutional: A&O x 3; NAD; calm and cooperative  Eyes: EOM's intact  HEENT: Normocephalic, Atraumatic. Oral mucosa moist.   Neck: Supple. No significant JVD, lymphadenopathy.   Lungs: CTAB with a few bibasilar crackles. Respirations even and unlabored on 2 liters.   Heart: RRR; + murmur.  Abdomen: Softly distended, non-tender, +BS  MS/Extremities: MORGAN equally x 4. No significant edema. Peripheral pulses intact bilaterally. Chronic vascular changes to lower extremities.   Neuro: A&O x3; no focal deficits; gross motor and sensation intact.   Skin: Warm and dry. No rashes or lesions  Psych: Normal affect.      Temp:  [37 °C (98.6 °F)-37.8 °C (100 °F)] 37.1 °C (98.8 °F)  Heart Rate:  [60-78] 78  Resp:  [17-23] 20  BP: ()/(46-69) 92/63    Vitals:    10/02/24 1125   Weight: 90.7 kg (200 lb)             I/Os    Intake/Output Summary (Last 24 hours) at 10/3/2024 1218  Last data filed at 10/3/2024 1129  Gross per 24 hour   Intake 1945 ml   Output 1350 ml   Net 595 ml       Labs:   Results from last 72 hours   Lab Units 10/03/24  0643 10/02/24  1140 09/30/24 2036   SODIUM mmol/L 132* 133* 135*   POTASSIUM mmol/L 3.6 3.9 3.9   CHLORIDE mmol/L 95* 100 101   CO2 mmol/L 30 25 26   BUN mg/dL 20 19 25*    CREATININE mg/dL 1.70* 1.44* 1.44*   GLUCOSE mg/dL 115* 129* 187*   CALCIUM mg/dL 8.5* 8.8 9.1   ANION GAP mmol/L 11 12 12   EGFR mL/min/1.73m*2 38* 46* 46*      Results from last 72 hours   Lab Units 10/03/24  0643 10/02/24  1140 09/30/24  2036   WBC AUTO x10*3/uL 6.9 8.8 9.8   HEMOGLOBIN g/dL 13.5 12.1* 14.9   HEMATOCRIT % 40.0* 35.3* 45.4   PLATELETS AUTO x10*3/uL 144* 152 189   NEUTROS PCT AUTO % 82.0 91.7 77.3   LYMPHS PCT AUTO % 4.9 1.8 11.1   MONOS PCT AUTO % 11.8 5.5 9.9   EOS PCT AUTO % 0.3 0.1 0.9      Lab Results   Component Value Date    CALCIUM 8.5 (L) 10/03/2024    PHOS 3.1 04/15/2020      Lab Results   Component Value Date    CRP 0.61 05/21/2018        Micro/ID:   No results found for the last 90 days.    .ID  Lab Results   Component Value Date    URINECULTURE No significant growth 09/30/2024       Images:  CT Head:  Impression:     No evidence of acute cortical infarct or intracranial hemorrhage.      Mild age-related chronic changes.      CT Angio Chest for Pulmonary Embolism:  Impression:     No CT evidence of pulmonary embolism in the current exam.      Evidence of prior granulomatous disease vs. Prior asbestos exposure.  Clinical correlation needed.      Small hiatal hernia.      Cardiomegaly.      Thoracic spine DJD. Slight anterior wedge deformities from T6 through  T8, most likely remote.      Mild aneurysmal dilatation of the ascending aorta as described.      CXR:   Impression:     Hypoventilatory exam.      Bilateral calcified pleural plaques as described.      Stable cardiac pacemaker on the left.      Cardiomegaly with borderline central vascular congestion.       Meds    Scheduled medications  apixaban, 5 mg, oral, BID  azithromycin, 500 mg, oral, q24h MEDARDO  cefTRIAXone, 1 g, intravenous, q24h  cholecalciferol, 2,000 Units, oral, Daily  docusate sodium, 100 mg, oral, BID  escitalopram, 20 mg, oral, Daily  [Held by provider] furosemide, 40 mg, intravenous, q12h  primidone, 50 mg, oral,  Nightly  rosuvastatin, 10 mg, oral, Daily  sodium chloride, 500 mL, intravenous, Once      Continuous medications     PRN medications  PRN medications: acetaminophen, ipratropium-albuteroL, loperamide, meclizine, melatonin, ondansetron **OR** ondansetron     Assessment and Plan    89 y.o. male with a medical history of HTN, PAF, and Aortic Stenosis who presented to [facility] ED with c/o weakness, dizziness, and shortness of breath. In the ED, . Troponin 38>43. Magnesium 1.38. CT Head negative for acute findings. No PE on CT Angio. CXR with some vascular congestion. Admitted for acute hypoxic respiratory failure.     Acute Hypoxic Respiratory Failure - Improved  -May be secondary to acute CHF vs. Infectious/viral infection, or immune response to recent vaccines (COVID/Influenza on 10/1/24)  -, CXR with vascular congestion  -Afebrile. No leukocytosis. Negative for Influenza/COVID on admission.  -Not clear picture of heart failure at this time as appears dry on exam and is having dizziness with + orthostatic hypotension  -Consult Cardiology - established patient of Dr. Aguirre  -Recent Echocardiogram (9/4/24): LVEF 60%, mild aortic stenosis  -Continue antibiotics for CAP until infectious process ruled out - Ceftriaxone, Azithromycin  -Procalcitonin pending; will stop antibiotics if needed.   -Holding Lasix and will give gentle hydration  -Weaned to RA this AM w/o difficulty     CV: HTN, HFpEF, PAF, s/p PPM  -Troponin elevation: 38>43>47  -Suspect demand ischemia in setting of acute hypoxic respiratory failure  -No ACS symptoms  -Cardiology consulted.   -Monitor on telemetry  -On Apixaban     Acute Kidney Injury  -Suspect this is related to hydration, Lasix  -Avoid nephrotoxins, Lasix  -Gentle hydration  -Repeat BMP in AM.     Dizziness  -Has history of vertigo  -Per recent cardiology note was found to be hypotensive and hypovolemic  -Will monitor on telemetry  -On Meclizine PRN  -See above     Depression  -On  Escitalopram     DVT Prophylaxis  -On Apixaban (A.Fib)     Fluids/Electrolytes/Nutrition  -Laboratory data reviewed.   -Electrolytes stable.   -No nutritional concerns at this time.       Disposition  -Plan of care discussed with attending, Dr. Joyner, Cardiology APRN.   -Daughter at bedside and updated on plan of care.   -Tentative plan for discharge to home in AM.       Rhianna Andersen, APRN-CNP

## 2024-10-04 LAB
ALBUMIN SERPL BCP-MCNC: 3.2 G/DL (ref 3.4–5)
ALP SERPL-CCNC: 56 U/L (ref 33–136)
ALT SERPL W P-5'-P-CCNC: 20 U/L (ref 10–52)
ANION GAP SERPL CALC-SCNC: 13 MMOL/L (ref 10–20)
AST SERPL W P-5'-P-CCNC: 30 U/L (ref 9–39)
BASOPHILS # BLD AUTO: 0.04 X10*3/UL (ref 0–0.1)
BASOPHILS NFR BLD AUTO: 0.6 %
BILIRUB SERPL-MCNC: 0.4 MG/DL (ref 0–1.2)
BUN SERPL-MCNC: 23 MG/DL (ref 6–23)
CALCIUM SERPL-MCNC: 8.3 MG/DL (ref 8.6–10.3)
CHLORIDE SERPL-SCNC: 96 MMOL/L (ref 98–107)
CO2 SERPL-SCNC: 26 MMOL/L (ref 21–32)
CREAT SERPL-MCNC: 1.21 MG/DL (ref 0.5–1.3)
EGFRCR SERPLBLD CKD-EPI 2021: 57 ML/MIN/1.73M*2
EOSINOPHIL # BLD AUTO: 0.11 X10*3/UL (ref 0–0.4)
EOSINOPHIL NFR BLD AUTO: 1.8 %
ERYTHROCYTE [DISTWIDTH] IN BLOOD BY AUTOMATED COUNT: 13.3 % (ref 11.5–14.5)
GLUCOSE SERPL-MCNC: 99 MG/DL (ref 74–99)
HCT VFR BLD AUTO: 39 % (ref 41–52)
HGB BLD-MCNC: 13.3 G/DL (ref 13.5–17.5)
IMM GRANULOCYTES # BLD AUTO: 0.02 X10*3/UL (ref 0–0.5)
IMM GRANULOCYTES NFR BLD AUTO: 0.3 % (ref 0–0.9)
LYMPHOCYTES # BLD AUTO: 0.56 X10*3/UL (ref 0.8–3)
LYMPHOCYTES NFR BLD AUTO: 9.1 %
MCH RBC QN AUTO: 29.8 PG (ref 26–34)
MCHC RBC AUTO-ENTMCNC: 34.1 G/DL (ref 32–36)
MCV RBC AUTO: 87 FL (ref 80–100)
MONOCYTES # BLD AUTO: 0.88 X10*3/UL (ref 0.05–0.8)
MONOCYTES NFR BLD AUTO: 14.2 %
NEUTROPHILS # BLD AUTO: 4.57 X10*3/UL (ref 1.6–5.5)
NEUTROPHILS NFR BLD AUTO: 74 %
NRBC BLD-RTO: 0 /100 WBCS (ref 0–0)
PLATELET # BLD AUTO: 151 X10*3/UL (ref 150–450)
POTASSIUM SERPL-SCNC: 3.5 MMOL/L (ref 3.5–5.3)
PROT SERPL-MCNC: 6.3 G/DL (ref 6.4–8.2)
RBC # BLD AUTO: 4.47 X10*6/UL (ref 4.5–5.9)
SODIUM SERPL-SCNC: 131 MMOL/L (ref 136–145)
WBC # BLD AUTO: 6.2 X10*3/UL (ref 4.4–11.3)

## 2024-10-04 PROCEDURE — 2500000001 HC RX 250 WO HCPCS SELF ADMINISTERED DRUGS (ALT 637 FOR MEDICARE OP): Performed by: STUDENT IN AN ORGANIZED HEALTH CARE EDUCATION/TRAINING PROGRAM

## 2024-10-04 PROCEDURE — 85025 COMPLETE CBC W/AUTO DIFF WBC: CPT | Performed by: STUDENT IN AN ORGANIZED HEALTH CARE EDUCATION/TRAINING PROGRAM

## 2024-10-04 PROCEDURE — 99232 SBSQ HOSP IP/OBS MODERATE 35: CPT | Performed by: STUDENT IN AN ORGANIZED HEALTH CARE EDUCATION/TRAINING PROGRAM

## 2024-10-04 PROCEDURE — 2500000002 HC RX 250 W HCPCS SELF ADMINISTERED DRUGS (ALT 637 FOR MEDICARE OP, ALT 636 FOR OP/ED): Performed by: NURSE PRACTITIONER

## 2024-10-04 PROCEDURE — 2500000001 HC RX 250 WO HCPCS SELF ADMINISTERED DRUGS (ALT 637 FOR MEDICARE OP): Performed by: NURSE PRACTITIONER

## 2024-10-04 PROCEDURE — 97165 OT EVAL LOW COMPLEX 30 MIN: CPT | Mod: GO

## 2024-10-04 PROCEDURE — 97530 THERAPEUTIC ACTIVITIES: CPT | Mod: GO

## 2024-10-04 PROCEDURE — 36415 COLL VENOUS BLD VENIPUNCTURE: CPT | Performed by: STUDENT IN AN ORGANIZED HEALTH CARE EDUCATION/TRAINING PROGRAM

## 2024-10-04 PROCEDURE — 97161 PT EVAL LOW COMPLEX 20 MIN: CPT | Mod: GP

## 2024-10-04 PROCEDURE — 80053 COMPREHEN METABOLIC PANEL: CPT | Performed by: STUDENT IN AN ORGANIZED HEALTH CARE EDUCATION/TRAINING PROGRAM

## 2024-10-04 PROCEDURE — 1200000002 HC GENERAL ROOM WITH TELEMETRY DAILY

## 2024-10-04 PROCEDURE — 97530 THERAPEUTIC ACTIVITIES: CPT | Mod: GP

## 2024-10-04 RX ORDER — AMOXICILLIN AND CLAVULANATE POTASSIUM 875; 125 MG/1; MG/1
1 TABLET, FILM COATED ORAL EVERY 12 HOURS SCHEDULED
Status: DISCONTINUED | OUTPATIENT
Start: 2024-10-04 | End: 2024-10-04

## 2024-10-04 RX ORDER — POTASSIUM CHLORIDE 20 MEQ/1
20 TABLET, EXTENDED RELEASE ORAL ONCE
Status: COMPLETED | OUTPATIENT
Start: 2024-10-04 | End: 2024-10-04

## 2024-10-04 RX ORDER — MIDODRINE HYDROCHLORIDE 5 MG/1
5 TABLET ORAL
Status: DISCONTINUED | OUTPATIENT
Start: 2024-10-04 | End: 2024-10-05

## 2024-10-04 ASSESSMENT — COGNITIVE AND FUNCTIONAL STATUS - GENERAL
DAILY ACTIVITIY SCORE: 20
MOVING TO AND FROM BED TO CHAIR: A LITTLE
MOVING TO AND FROM BED TO CHAIR: A LITTLE
DRESSING REGULAR LOWER BODY CLOTHING: A LITTLE
WALKING IN HOSPITAL ROOM: A LITTLE
TOILETING: A LITTLE
DRESSING REGULAR UPPER BODY CLOTHING: A LITTLE
DRESSING REGULAR LOWER BODY CLOTHING: A LITTLE
MOBILITY SCORE: 20
TOILETING: A LITTLE
TOILETING: A LITTLE
CLIMB 3 TO 5 STEPS WITH RAILING: A LITTLE
HELP NEEDED FOR BATHING: A LITTLE
MOBILITY SCORE: 17
DRESSING REGULAR UPPER BODY CLOTHING: A LITTLE
STANDING UP FROM CHAIR USING ARMS: A LITTLE
STANDING UP FROM CHAIR USING ARMS: A LITTLE
DRESSING REGULAR UPPER BODY CLOTHING: A LITTLE
DRESSING REGULAR LOWER BODY CLOTHING: A LITTLE
MOVING TO AND FROM BED TO CHAIR: A LITTLE
CLIMB 3 TO 5 STEPS WITH RAILING: A LOT
HELP NEEDED FOR BATHING: A LITTLE
CLIMB 3 TO 5 STEPS WITH RAILING: A LITTLE
WALKING IN HOSPITAL ROOM: A LITTLE
TURNING FROM BACK TO SIDE WHILE IN FLAT BAD: A LITTLE
PERSONAL GROOMING: A LITTLE
HELP NEEDED FOR BATHING: A LITTLE
DAILY ACTIVITIY SCORE: 19
MOBILITY SCORE: 20
STANDING UP FROM CHAIR USING ARMS: A LITTLE
WALKING IN HOSPITAL ROOM: A LOT
DAILY ACTIVITIY SCORE: 20

## 2024-10-04 ASSESSMENT — PAIN - FUNCTIONAL ASSESSMENT
PAIN_FUNCTIONAL_ASSESSMENT: 0-10

## 2024-10-04 ASSESSMENT — ACTIVITIES OF DAILY LIVING (ADL)
BATHING_ASSISTANCE: MINIMAL
ADL_ASSISTANCE: INDEPENDENT
ADL_ASSISTANCE: INDEPENDENT

## 2024-10-04 ASSESSMENT — PAIN SCALES - GENERAL
PAINLEVEL_OUTOF10: 0 - NO PAIN

## 2024-10-04 NOTE — CARE PLAN
The patient's goals for the shift include      The clinical goals for the shift include pt will ambulate safely with assistance    Over the shift, the patient did not make progress toward the following goals. Barriers to progression include patient fatigued. Recommendations to address these barriers include adequate rest.

## 2024-10-04 NOTE — NURSING NOTE
1930 Assumed care of patient,   2045 assessment as charted, VSS, patient awake in bed, alert and oriented x3. Patient denies pain, nausea, sob or CP. IV intact, bed in lowest and locked position. Bed alarm on and functioning. Call light within reach, safety maintained. Plan of care ongoing

## 2024-10-04 NOTE — PROGRESS NOTES
Patient: Garrison Rodriguez  Room/bed: 112/112-A  Admitted on: 10/2/2024    Age: 89 y.o.   Gender: male  Code Status:  Full Code   Admitting Dx: Acute respiratory failure with hypoxia (Multi) [J96.01]  Generalized weakness [R53.1]  Acute on chronic heart failure, unspecified heart failure type [I50.9]    MRN: [id number]  PCP: HI Fernandez       Subjective   Seen and examined in his room this AM. Up in chair. Feels better. Dizziness has improved. Off the oxygen. He denies chest pain, breathing difficulties, abdominal pain, N/V/D/C, fever, or chills.  No BM since admission.    Objective    Physical Exam   Constitutional: A&O x 3; NAD; calm and cooperative  Eyes: EOM's intact  HEENT: Normocephalic, Atraumatic. Oral mucosa moist.   Neck: Supple. No significant JVD, lymphadenopathy.   Lungs: CTAB with fair air movement. Respirations even and unlabored on room air.   Heart: RRR; + murmur.  Abdomen: Softly distended, non-tender, +BS  MS/Extremities: MORGAN equally x 4. No significant edema. Peripheral pulses intact bilaterally. Chronic vascular changes to lower extremities.   Neuro: A&O x3; no focal deficits; gross motor and sensation intact.   Skin: Warm and dry. No rashes or lesions  Psych: Normal affect.      Temp:  [36.2 °C (97.2 °F)-37.3 °C (99.1 °F)] 36.5 °C (97.7 °F)  Heart Rate:  [61-71] 71  Resp:  [18-24] 20  BP: ()/(61-73) 86/69    Vitals:    10/02/24 1125   Weight: 90.7 kg (200 lb)             I/Os    Intake/Output Summary (Last 24 hours) at 10/4/2024 1254  Last data filed at 10/4/2024 1235  Gross per 24 hour   Intake 2298.33 ml   Output 775 ml   Net 1523.33 ml       Labs:   Results from last 72 hours   Lab Units 10/04/24  0702 10/03/24  0643 10/02/24  1140   SODIUM mmol/L 131* 132* 133*   POTASSIUM mmol/L 3.5 3.6 3.9   CHLORIDE mmol/L 96* 95* 100   CO2 mmol/L 26 30 25   BUN mg/dL 23 20 19   CREATININE mg/dL 1.21 1.70* 1.44*   GLUCOSE mg/dL 99 115* 129*   CALCIUM mg/dL 8.3* 8.5* 8.8   ANION GAP mmol/L 13  11 12   EGFR mL/min/1.73m*2 57* 38* 46*      Results from last 72 hours   Lab Units 10/04/24  0702 10/03/24  0643 10/02/24  1140   WBC AUTO x10*3/uL 6.2 6.9 8.8   HEMOGLOBIN g/dL 13.3* 13.5 12.1*   HEMATOCRIT % 39.0* 40.0* 35.3*   PLATELETS AUTO x10*3/uL 151 144* 152   NEUTROS PCT AUTO % 74.0 82.0 91.7   LYMPHS PCT AUTO % 9.1 4.9 1.8   MONOS PCT AUTO % 14.2 11.8 5.5   EOS PCT AUTO % 1.8 0.3 0.1      Lab Results   Component Value Date    CALCIUM 8.3 (L) 10/04/2024    PHOS 3.1 04/15/2020      Lab Results   Component Value Date    CRP 0.61 05/21/2018        Micro/ID:   No results found for the last 90 days.    .ID  Lab Results   Component Value Date    URINECULTURE No significant growth 09/30/2024       Images:  CT Head:  Impression:     No evidence of acute cortical infarct or intracranial hemorrhage.      Mild age-related chronic changes.      CT Angio Chest for Pulmonary Embolism:  Impression:     No CT evidence of pulmonary embolism in the current exam.      Evidence of prior granulomatous disease vs. Prior asbestos exposure.  Clinical correlation needed.      Small hiatal hernia.      Cardiomegaly.      Thoracic spine DJD. Slight anterior wedge deformities from T6 through  T8, most likely remote.      Mild aneurysmal dilatation of the ascending aorta as described.      CXR:   Impression:     Hypoventilatory exam.      Bilateral calcified pleural plaques as described.      Stable cardiac pacemaker on the left.      Cardiomegaly with borderline central vascular congestion.       Meds    Scheduled medications  amoxicillin-pot clavulanate, 1 tablet, oral, q12h MEDARDO  apixaban, 5 mg, oral, BID  cholecalciferol, 2,000 Units, oral, Daily  docusate sodium, 100 mg, oral, BID  escitalopram, 20 mg, oral, Daily  midodrine, 5 mg, oral, TID  primidone, 50 mg, oral, Nightly  rosuvastatin, 10 mg, oral, Daily      Continuous medications     PRN medications  PRN medications: acetaminophen, ipratropium-albuteroL, loperamide,  meclizine, melatonin, ondansetron **OR** ondansetron     Assessment and Plan    89 y.o. male with a medical history of HTN, PAF, and Aortic Stenosis who presented to [facility] ED with c/o weakness, dizziness, and shortness of breath. In the ED, . Troponin 38>43. Magnesium 1.38. CT Head negative for acute findings. No PE on CT Angio. CXR with some vascular congestion. Admitted for acute hypoxic respiratory failure.     Acute Hypoxic Respiratory Failure - Resolved.   -May be secondary to acute CHF vs. Infectious/viral infection, or immune response to recent vaccines (COVID/Influenza on 10/1/24)  -, CXR with vascular congestion  -Afebrile. No leukocytosis. Negative for Influenza/COVID on admission.  -Not clear picture of heart failure at this time as appears dry on exam and is having dizziness with + orthostatic hypotension  -Consult Cardiology - established patient of Dr. Aguirre  -Recent Echocardiogram (9/4/24): LVEF 60%, mild aortic stenosis  -Continue antibiotics: Augmentin  -Procalcitonin 0.27  -Holding Lasix and was gived gentle hydration  -Weaned to RA w/o difficulty     CV: HTN, HFpEF, PAF, s/p PPM  -Orthostatic hypotension - give IVF bolus; midodrine ordered by cardiology; MIR hose.   -Troponin elevation: 38>43>47  -Suspect demand ischemia in setting of acute hypoxic respiratory failure  -No ACS symptoms  -Cardiology consulted.   -Monitor on telemetry  -On Apixaban     Acute Kidney Injury - Resolved  -Suspect this is related to hydration, Lasix  -Avoid nephrotoxins  -Gentle hydration  -Creatinine improved: 1.7>1.21  -Repeat BMP in AM.     Dizziness  -Has history of vertigo  -Per recent cardiology note was found to be hypotensive and hypovolemic  -Will monitor on telemetry  -On Meclizine PRN  -See above for management of orthostatic hypotension     Depression  -On Escitalopram     DVT Prophylaxis  -On Apixaban (A.Fib)     Fluids/Electrolytes/Nutrition  -Laboratory data reviewed.   -Electrolytes stable.  Hyponatremia.   -No nutritional concerns at this time.       Disposition  -Plan of care discussed with attending, Dr. Joyner, Cardiology APRN.   -Daughter at bedside and updated on plan of care.   -PT/OT recommend 24 hour supervision. Precert for SNF initiated.       Rhianna Andersen, APRN-CNP

## 2024-10-04 NOTE — PROGRESS NOTES
"Occupational Therapy    Evaluation/Treatment    Patient Name: Garrison Rodriguez \"Jean Claude\"  MRN: [id number]  Department: [address]  Room: [address] [place] [address]  Today's Date: 10/04/24  Time Calculation  Start Time: 0940  Stop Time: 1008  Time Calculation (min): 28 min       Assessment:  Prognosis: Good  Barriers to Discharge: None  Evaluation/Treatment Tolerance: Patient tolerated treatment well  End of Session Communication: Bedside nurse  End of Session Patient Position: Alarm on, Up in chair  OT Assessment Results: Decreased ADL status, Decreased cognition, Decreased endurance, Decreased functional mobility, Decreased IADLs  Prognosis: Good  Barriers to Discharge: None  Evaluation/Treatment Tolerance: Patient tolerated treatment well  Plan:  OT Frequency: 3 times per week  OT Discharge Recommendations: Moderate intensity level of continued care  Equipment Recommended upon Discharge: Wheeled walker       Subjective   Current Problem:  1. Acute respiratory failure with hypoxia (Multi)        2. Generalized weakness        3. Acute on chronic heart failure, unspecified heart failure type          General:      General  Reason for Referral: Pt admitted with weakness, dizziness, SOB > UTI and acute respiratory failure  Referred By: HI Franco  Past Medical History Relevant to Rehab: PMH:  HTN, PAF, and Aortic Stenosis  Family/Caregiver Present: Yes  Caregiver Feedback: Daughter Douglas (who pt lives with). She is a nurse  Co-Treatment: PT  Co-Treatment Reason: To maximize pt mobility safely  Prior to Session Communication: Bedside nurse  Patient Position Received: Bed, 3 rail up, Alarm on  General Comment: Pt pleasant and agreeable to therapy. Pt reported dizziness with positional changes. Daughter states pt has a history of vertigo, interested in vestibular therapy in Outpatient PT setting  Precautions:  Hearing/Visual Limitations: reading glasses, hearing aids  Medical Precautions: Fall precautions    Objective " "  Cognition:  Orientation Level: Disoriented to time (Increased time required to recall current month. Unable to recall current year, daughter states this is new since admission)     Home Living:  Type of Home: House (in law suite at daughter's house)  Lives With: Adult children  Home Adaptive Equipment: Walker rolling or standard, Cane  Home Layout: Two level, Able to live on main level with bedroom/bathroom  Home Access: Stairs to enter with rails  Entrance Stairs-Rails: Left  Bathroom Shower/Tub: Walk-in shower  Bathroom Toilet: Standard  Bathroom Equipment: Grab bars in shower  Prior Function:  Level of [place]: Independent with ADLs and functional transfers, Independent with homemaking with ambulation  Receives Help From: Family  ADL Assistance: Independent  Homemaking Assistance: Independent  Ambulatory Assistance:  (FWW use at night, cane use if feeling \"dizzy\")  IADL History:  Current License: Yes  ADL:  Eating Assistance: Independent  Grooming Assistance: Stand by  Grooming Deficit: Setup  Bathing Assistance: Minimal  Bathing Deficit: Right lower leg including foot, Left lower leg including foot  UE Dressing Assistance: Stand by  UE Dressing Deficit: Steadying, Supervision/safety  LE Dressing Assistance: Minimal  LE Dressing Deficit: Thread LLE into underwear, Thread RLE into pants, Thread LLE into pants, Thread RLE into underwear, Requires assistive device for steadying, Supervision/safety  Toileting Assistance with Device: Minimal  Toileting Deficit: Perineal hygiene, Steadying  Functional Assistance: Minimal    Activity Tolerance:  Endurance: Tolerates 30 min exercise with multiple rests  Functional Standing Tolerance:     Bed Mobility/Transfers: Bed Mobility 1  Bed Mobility 1: Supine to sitting  Level of Assistance 1: Close supervision  Bed Mobility 2  Bed Mobility  2: Sitting to supine  Level of Assistance 2: Minimum assistance    Transfer 1  Transfer From 1: Bed to, Sit to  Transfer to 1: " Stand  Technique 1: Sit to stand  Transfer Device 1: Walker  Transfer Level of Assistance 1: Contact guard  Transfers 2  Transfer From 2: Stand to  Transfer to 2: Sit, Bed  Technique 2: Stand to sit  Transfer Device 2: Walker  Transfer Level of Assistance 2: Contact guard  Transfers 3  Transfer From 3: Bed to, Sit to  Transfer to 3: Chair with arms  Technique 3: Stand pivot  Transfer Device 3: Walker  Transfer Level of Assistance 3: Minimum assistance  Functional Mobility:  Functional Mobility  Functional Mobility Performed: Yes  Functional Mobility 1  Surface 1: Level tile  Device 1: Rolling walker  Assistance 1: Minimum assistance  Sitting Balance:  Dynamic Sitting Balance  Dynamic Sitting-Balance Support: Feet supported  Dynamic Sitting-Level of Assistance: Minimum assistance  Dynamic Sitting-Balance: Trunk control activities    Vision:Vision - Basic Assessment  Current Vision: Wears glasses only for reading  Coordination:  Movements are Fluid and Coordinated: Yes  Coordination Comment: Does have an intermittent tremor in BUEs   Extremities: RUE   RUE : Within Functional Limits and LUE   LUE: Within Functional Limits  Outcome Measures: [facility] Daily Activity  Putting on and taking off regular lower body clothing: A little  Bathing (including washing, rinsing, drying): A little  Putting on and taking off regular upper body clothing: A little  Toileting, which includes using toilet, bedpan or urinal: A little  Taking care of personal grooming such as brushing teeth: A little  Eating Meals: None  Daily Activity - Total Score: 19        Education Documentation  Body Mechanics, taught by Edith Camargo OT at 10/4/2024 11:04 AM.  Learner: Family, Patient  Readiness: Acceptance  Method: Explanation  Response: Verbalizes Understanding    Precautions, taught by Edith Camargo OT at 10/4/2024 11:04 AM.  Learner: Family, Patient  Readiness: Acceptance  Method: Explanation  Response: Verbalizes Understanding    ADL Training,  taught by Edith Camargo OT at 10/4/2024 11:04 AM.  Learner: Family, Patient  Readiness: Acceptance  Method: Explanation  Response: Verbalizes Understanding    Education Comments  No comments found.      Goals:  Encounter Problems       Encounter Problems (Active)       ADLs       Patient with complete upper body dressing with modified independent level of assistance donning and doffing all UE clothes with PRN adaptive equipment while edge of bed        Start:  10/04/24    Expected End:  10/18/24            Patient with complete lower body dressing with supervision level of assistance donning and doffing all LE clothes  with PRN adaptive equipment while edge of bed        Start:  10/04/24    Expected End:  10/18/24            Patient will complete daily grooming tasks brushing teeth and washing face/hair with supervision level of assistance and PRN adaptive equipment while edge of bed .       Start:  10/04/24    Expected End:  10/18/24            Patient will complete toileting including hygiene clothing management/hygiene with stand by assist level of assistance and raised toilet seat and grab bars.       Start:  10/04/24    Expected End:  10/18/24               TRANSFERS       Patient will perform bed mobility stand by assist level of assistance and bed rails in order to improve safety and independence with mobility       Start:  10/04/24    Expected End:  10/18/24            Patient will complete functional transfer to chair with arms with least restrictive device with stand by assist level of assistance.       Start:  10/04/24    Expected End:  10/18/24

## 2024-10-04 NOTE — PROGRESS NOTES
"Garrison Rodriguez \"Porfirio" is a 89 y.o. male on day 2 of admission presenting with Acute respiratory failure with hypoxia (Multi).      Subjective   He is sitting up in the chair, states he feels better than yesterday but still pretty dizzy with standing.       Review of systems:  Constitutional: negative for fever, chills, or malaise  Neuro: negative for headache, numbness, tingling  ENT: Negative for nasal congestion or sore throat  Resp: negative for shortness of breath, cough, or wheezing  CV: negative for chest pain, palpitations  GI: negative for abd pain, nausea, vomiting or diarrhea  : negative for dysuria, frequency, or urgency  Skin: negative for lesions, wounds, or rash  Musculoskeletal: Negative for weakness, myalgia, or arthralgia  Endocrine: Negative for polyuria or polydipsia         Objective   Constitutional: Well developed, awake/alert/oriented x3, no distress, alert and cooperative  Eyes: PERRL, EOMI, clear sclera  ENMT: mucous membranes moist, no apparent injury, no lesions seen  Head/Neck: Neck supple, no apparent injury, thyroid without mass or tenderness, No JVD, trachea midline, no bruits  Respiratory/Thorax: Patent airways, CTAB, normal breath sounds with good chest expansion, thorax symmetric  Cardiovascular: Regular, rate and rhythm, no murmurs, 2+ equal pulses of the extremities, normal S 1and S 2  Gastrointestinal: Nondistended, soft, non-tender, no rebound tenderness or guarding, no masses palpable, no organomegaly, +BS, no bruits  Musculoskeletal: ROM intact, no joint swelling, normal strength  Extremities: normal extremities, no cyanosis edema, contusions or wounds, no clubbing  Neurological: alert and oriented x3, intact senses, motor, response and reflexes, normal strength  Lymphatic: No significant lymphadenopathy  Psychological: Appropriate mood and behavior  Skin: Warm and dry, no lesions, no rashes      Last Recorded Vitals  BP 86/69 (BP Location: Left arm, Patient Position: Standing) " "  Pulse 71   Temp 36.5 °C (97.7 °F) (Temporal)   Resp 20   Ht 1.778 m (5' 10\")   Wt 90.7 kg (200 lb)   SpO2 93%   BMI 28.70 kg/m²     Intake/Output last 3 Shifts:  I/O last 3 completed shifts:  In: 2198.3 (24.2 mL/kg) [P.O.:1140; IV Piggyback:1058.3]  Out: 1850 (20.4 mL/kg) [Urine:1850 (0.6 mL/kg/hr)]  Weight: 90.7 kg   I/O this shift:  In: 300 [P.O.:300]  Out: -     Relevant Results  Scheduled medications  amoxicillin-pot clavulanate, 1 tablet, oral, q12h MEDARDO  apixaban, 5 mg, oral, BID  cholecalciferol, 2,000 Units, oral, Daily  docusate sodium, 100 mg, oral, BID  escitalopram, 20 mg, oral, Daily  midodrine, 5 mg, oral, TID  primidone, 50 mg, oral, Nightly  rosuvastatin, 10 mg, oral, Daily      Continuous medications     PRN medications  PRN medications: acetaminophen, ipratropium-albuteroL, loperamide, meclizine, melatonin, ondansetron **OR** ondansetron    Results for orders placed or performed during the hospital encounter of 10/02/24 (from the past 24 hour(s))   Comprehensive Metabolic Panel   Result Value Ref Range    Glucose 99 74 - 99 mg/dL    Sodium 131 (L) 136 - 145 mmol/L    Potassium 3.5 3.5 - 5.3 mmol/L    Chloride 96 (L) 98 - 107 mmol/L    Bicarbonate 26 21 - 32 mmol/L    Anion Gap 13 10 - 20 mmol/L    Urea Nitrogen 23 6 - 23 mg/dL    Creatinine 1.21 0.50 - 1.30 mg/dL    eGFR 57 (L) >60 mL/min/1.73m*2    Calcium 8.3 (L) 8.6 - 10.3 mg/dL    Albumin 3.2 (L) 3.4 - 5.0 g/dL    Alkaline Phosphatase 56 33 - 136 U/L    Total Protein 6.3 (L) 6.4 - 8.2 g/dL    AST 30 9 - 39 U/L    Bilirubin, Total 0.4 0.0 - 1.2 mg/dL    ALT 20 10 - 52 U/L   CBC and Auto Differential   Result Value Ref Range    WBC 6.2 4.4 - 11.3 x10*3/uL    nRBC 0.0 0.0 - 0.0 /100 WBCs    RBC 4.47 (L) 4.50 - 5.90 x10*6/uL    Hemoglobin 13.3 (L) 13.5 - 17.5 g/dL    Hematocrit 39.0 (L) 41.0 - 52.0 %    MCV 87 80 - 100 fL    MCH 29.8 26.0 - 34.0 pg    MCHC 34.1 32.0 - 36.0 g/dL    RDW 13.3 11.5 - 14.5 %    Platelets 151 150 - 450 x10*3/uL "    Neutrophils % 74.0 40.0 - 80.0 %    Immature Granulocytes %, Automated 0.3 0.0 - 0.9 %    Lymphocytes % 9.1 13.0 - 44.0 %    Monocytes % 14.2 2.0 - 10.0 %    Eosinophils % 1.8 0.0 - 6.0 %    Basophils % 0.6 0.0 - 2.0 %    Neutrophils Absolute 4.57 1.60 - 5.50 x10*3/uL    Immature Granulocytes Absolute, Automated 0.02 0.00 - 0.50 x10*3/uL    Lymphocytes Absolute 0.56 (L) 0.80 - 3.00 x10*3/uL    Monocytes Absolute 0.88 (H) 0.05 - 0.80 x10*3/uL    Eosinophils Absolute 0.11 0.00 - 0.40 x10*3/uL    Basophils Absolute 0.04 0.00 - 0.10 x10*3/uL       CT head wo IV contrast   Final Result   No evidence of acute cortical infarct or intracranial hemorrhage.        Mild age-related chronic changes.        MACRO:   None        Signed by: Jose Roberto Rashid 10/2/2024 3:19 PM   Dictation workstation:   [id number]      CT angio chest for pulmonary embolism   Final Result   No CT evidence of pulmonary embolism in the current exam.        Evidence of prior granulomatous disease vs. Prior asbestos exposure.   Clinical correlation needed.        Small hiatal hernia.        Cardiomegaly.        Thoracic spine DJD. Slight anterior wedge deformities from T6 through   T8, most likely remote.        Mild aneurysmal dilatation of the ascending aorta as described.        MACRO:   None        Signed by: Parminder George 10/2/2024 2:21 PM   Dictation workstation:   [id number]      XR chest 1 view   Final Result   Hypoventilatory exam.        Bilateral calcified pleural plaques as described.        Stable cardiac pacemaker on the left.        Cardiomegaly with borderline central vascular congestion.        MACRO:   None        Signed by: Parminder George 10/2/2024 12:38 PM   Dictation workstation:   [id number]          No echocardiogram results found for the past 12 months         Assessment/Plan   Principal Problem:    Acute respiratory failure with hypoxia (Multi)    Echo from Sept 4, 2024: LVEF 60%, mild AS/MR/TR, LVH and abnormal LV  relaxation     Dizziness, fatigue  -Had 2 vaccines the other day, poor PO intake  -CT head negative  -Orthostatic VS positive  -EKG showed ventricular paced  -Pacemaker personally interrogated, normal functioning, no arrhythmias  -Magnesium 1.3->supplemented  -CXR showed cardiomegaly with mild vascular congestion  -CTA negative for PE, no effusions or edema  -Given IVF bolus in ER then IV lasix  -Creatinine up to 1.7->IVF bolus given x2  -Creatinine improved to 1.2  -Conts to be orthostatic  -Start midodrine 5mg TID  -MIR hose     2. Elevated troponin-Non MI elevation  -Denies ACS symptoms  -EKG V paced, unable to assess for ischemia  -Echo as above  -Cont statin     3. Hyperlipidemia  -Cont statin     4. Acute kidney injury  -Creatinine up to 1.7  -Most likely due to dehydration  -s/p IVF bolus x2  -Creatinine now 1.2  -Monitor     5. Paroxysmal atrial fibrillation  -Currently v paced  -Cont eliquis for CVA prevention  -Not on rate control meds  -Monitor on tele        NICOLE Kendall-CNP

## 2024-10-04 NOTE — PROGRESS NOTES
Physical Therapy    Physical Therapy Evaluation & Treatment    Patient Name: Jean Claude Rodriguez  MRN: [id number]  Department: [address]  Room: [address] [place] [address]  Today's Date: 10/4/2024   Time Calculation  Start Time: 0939  Stop Time: 1008  Time Calculation (min): 29 min    Assessment/Plan Pt is a 89 yo male who presents to the hospital with weakness, dizziness, and SOB. Prior to admission pt was independent with ADLs and mobility (use of FWW at night and cane when dizzy), lives in daughter's in law suite. Pt presents with weakness, impaired balance, and impaired mobility. Pt may benefit from PT services at this time for strengthening, balance training, and mobility training to return towards prior level of function.  PT Assessment  PT Assessment Results: Decreased strength, Impaired balance, Decreased mobility, Decreased cognition  Rehab Prognosis: Good  Evaluation/Treatment Tolerance: Patient tolerated treatment well  Medical Staff Made Aware: Yes  Strengths: Support of Caregivers  End of Session Communication: Bedside nurse  End of Session Patient Position: Alarm on, Up in chair   IP OR SWING BED PT PLAN  Inpatient or Swing Bed: Inpatient  PT Plan  Treatment/Interventions: Bed mobility, Transfer training, Gait training, Therapeutic exercise, Therapeutic activity, Home exercise program  PT Plan: Ongoing PT  PT Frequency: 3 times per week  PT Discharge Recommendations: Moderate intensity level of continued care  Equipment Recommended upon Discharge: Wheeled walker  PT Recommended Transfer Status: Assist x1  PT - OK to Discharge: Yes      Subjective     General Visit Information:  General  Reason for Referral: Pt admitted with weakness, dizziness, SOB > UTI and acute respiratory failure  Referred By: NICOLE Franco-CNP  Past Medical History Relevant to Rehab: PMH:  HTN, PAF, and Aortic Stenosis  Family/Caregiver Present: Yes  Caregiver Feedback: Daughter Douglas (who pt lives with). She is a nurse  Co-Treatment: OT  Co-Treatment  "Reason: To maximize pt mobility safely  Prior to Session Communication: Bedside nurse  Patient Position Received: Bed, 3 rail up, Alarm on  General Comment: Pt pleasant and agreeable to therapy. Pt reported dizziness with positional changes. Daughter states pt has a history of vertigo, interested in vestibular therapy in Outpatient PT setting  Home Living:  Home Living  Type of Home: House (In-law suite)  Lives With:  (Adult daughter)  Home Adaptive Equipment: Walker rolling or standard, Cane  Home Layout: Two level, Able to live on main level with bedroom/bathroom  Home Access: Stairs to enter with rails  Entrance Stairs-Rails: Left  Entrance Stairs-Number of Steps: 3  Bathroom Shower/Tub: Walk-in shower  Bathroom Toilet: Standard  Bathroom Equipment: Grab bars in shower  Home Living Comments: Sleeps in regular bed  Prior Level of Function:  Prior Function Per Pt/Caregiver Report  Level of [place]: Independent with ADLs and functional transfers, Independent with homemaking with ambulation  Receives Help From: Family  ADL Assistance: Independent  Homemaking Assistance: Independent  Ambulatory Assistance: Independent (FWW use at night, cane use if feeling \"dizzy\")  Precautions:  Precautions  Medical Precautions: Fall precautions    Vital Signs (Past 2hrs)                Objective   Pain:  Pain Assessment  Pain Assessment: 0-10  0-10 (Numeric) Pain Score: 0 - No pain  Cognition:  Cognition  Orientation Level: Disoriented to time (Increased time required to recall current month. Unable to recall current year, daughter states this is new since admission)    General Assessments:  General Observation  General Observation: Exernal urinary catheter, telemetry            Static Standing Balance  Static Standing-Balance Support: Bilateral upper extremity supported  Static Standing-Level of Assistance: Minimum assistance  Static Standing-Comment/Number of Minutes: Retrolean against bed  Functional Assessments:  Bed " Mobility  Bed Mobility: Yes  Bed Mobility 1  Bed Mobility 1: Supine to sitting  Level of Assistance 1: Close supervision  Bed Mobility 2  Bed Mobility  2: Sitting to supine  Level of Assistance 2: Minimum assistance (trunk)    Transfers  Transfer: Yes  Transfer 1  Transfer From 1: Bed to, Sit to  Transfer to 1: Stand  Technique 1: Sit to stand  Transfer Device 1: Walker  Transfer Level of Assistance 1: Contact guard  Trials/Comments 1: Completed twice, verbal cues for hand placement  Transfers 2  Transfer From 2: Stand to  Transfer to 2: Sit, Bed  Technique 2: Stand to sit  Transfer Device 2: Walker  Transfer Level of Assistance 2: Contact guard  Transfers 3  Transfer From 3: Bed to, Sit to  Transfer to 3: Chair with arms  Technique 3: Stand pivot  Transfer Device 3: Walker  Transfer Level of Assistance 3: Minimum assistance  Trials/Comments 3: Verbal cues for safe use of FWW, increased time required    Ambulation/Gait Training  Ambulation/Gait Training Performed: Yes  Ambulation/Gait Training 1  Surface 1: Level tile  Device 1: Rolling walker  Assistance 1: Minimum assistance  Quality of Gait 1:  (decreased carlos and step length)  Comments/Distance (ft) 1: Left lateral side steps along edge of bed, verbal cues for sequencing  Extremity/Trunk Assessments:  RLE   RLE : Within Functional Limits  LLE   LLE : Within Functional Limits  Treatments:       Bed Mobility  Bed Mobility: Yes  Bed Mobility 1  Bed Mobility 1: Supine to sitting  Level of Assistance 1: Close supervision  Bed Mobility 2  Bed Mobility  2: Sitting to supine  Level of Assistance 2: Minimum assistance (trunk)    Ambulation/Gait Training  Ambulation/Gait Training Performed: Yes  Ambulation/Gait Training 1  Surface 1: Level tile  Device 1: Rolling walker  Assistance 1: Minimum assistance  Quality of Gait 1:  (decreased carlos and step length)  Comments/Distance (ft) 1: Left lateral side steps along edge of bed, verbal cues for  sequencing  Transfers  Transfer: Yes  Transfer 1  Transfer From 1: Bed to, Sit to  Transfer to 1: Stand  Technique 1: Sit to stand  Transfer Device 1: Walker  Transfer Level of Assistance 1: Contact guard  Trials/Comments 1: Completed twice, verbal cues for hand placement  Transfers 2  Transfer From 2: Stand to  Transfer to 2: Sit, Bed  Technique 2: Stand to sit  Transfer Device 2: Walker  Transfer Level of Assistance 2: Contact guard  Transfers 3  Transfer From 3: Bed to, Sit to  Transfer to 3: Chair with arms  Technique 3: Stand pivot  Transfer Device 3: Walker  Transfer Level of Assistance 3: Minimum assistance  Trials/Comments 3: Verbal cues for safe use of FWW, increased time required  Outcome Measures:  [facility] Basic Mobility  Turning from your back to your side while in a flat bed without using bedrails: None  Moving from lying on your back to sitting on the side of a flat bed without using bedrails: A little  Moving to and from bed to chair (including a wheelchair): A little  Standing up from a chair using your arms (e.g. wheelchair or bedside chair): A little  To walk in hospital room: A lot  Climbing 3-5 steps with railing: A lot  Basic Mobility - Total Score: 17    Encounter Problems       Encounter Problems (Active)       Balance       STG - Maintains dynamic standing balance with upper extremity support, close supervision, no LOB, ~5 mins       Start:  10/04/24    Expected End:  10/18/24            STG - Maintains static standing balance with upper extremity support, close supervision, no retrolean, no LOB, ~2mins       Start:  10/04/24    Expected End:  10/18/24               Mobility       STG - Patient will ambulate at least 25'x2, LRAD, close supervision       Start:  10/04/24    Expected End:  10/18/24               PT Transfers       STG - Patient to transfer to and from sit to supine independently        Start:  10/04/24    Expected End:  10/18/24            STG - Patient will transfer sit to and  from stand with LRAD, close supervision       Start:  10/04/24    Expected End:  10/18/24               Pain - Adult          Safety       LTG - Patient will adhere to hip precautions during ADL's and transfers       Start:  10/02/24            LTG - Patient will demonstrate safety requirements appropriate to situation/environment       Start:  10/02/24            LTG - Patient will utilize safety techniques       Start:  10/02/24            STG - Patient locks brakes on wheelchair       Start:  10/02/24            STG - Patient uses call light consistently to request assistance with transfers       Start:  10/02/24            STG - Patient uses gait belt during all transfers       Start:  10/02/24            Goal 1       Start:  10/02/24            Goal 2       Start:  10/02/24            Goal 3       Start:  10/02/24                   Education Documentation  Body Mechanics, taught by Lorena Chaney PT at 10/4/2024 10:37 AM.  Learner: Family, Patient  Readiness: Acceptance  Method: Explanation, Demonstration  Response: Verbalizes Understanding, Demonstrated Understanding    Mobility Training, taught by Lorena Chaney PT at 10/4/2024 10:37 AM.  Learner: Family, Patient  Readiness: Acceptance  Method: Explanation, Demonstration  Response: Verbalizes Understanding, Demonstrated Understanding    Education Comments  No comments found.

## 2024-10-04 NOTE — PROGRESS NOTES
"Occupational Therapy    Evaluation/Treatment    Patient Name: Garrison Rodriguez \"Jean Claude\"  MRN: [id number]  Department: [address]  Room: [address] [place] [address]  Today's Date: 10/04/24  Time Calculation  Start Time: 0940  Stop Time: 1008  Time Calculation (min): 28 min       Assessment:  Prognosis: Good  Barriers to Discharge: None  Evaluation/Treatment Tolerance: Patient tolerated treatment well  End of Session Communication: Bedside nurse  End of Session Patient Position: Alarm on, Up in chair  OT Assessment Results: Decreased ADL status, Decreased cognition, Decreased endurance, Decreased functional mobility, Decreased IADLs  Prognosis: Good  Barriers to Discharge: None  Evaluation/Treatment Tolerance: Patient tolerated treatment well  Plan:  OT Frequency: 3 times per week  OT Discharge Recommendations: Moderate intensity level of continued care  Equipment Recommended upon Discharge: Wheeled walker       Subjective   Current Problem:  1. Acute respiratory failure with hypoxia (Multi)        2. Generalized weakness        3. Acute on chronic heart failure, unspecified heart failure type          General:      General  Reason for Referral: Pt admitted with weakness, dizziness, SOB > UTI and acute respiratory failure  Referred By: HI Franco  Past Medical History Relevant to Rehab: PMH:  HTN, PAF, and Aortic Stenosis  Family/Caregiver Present: Yes  Caregiver Feedback: Daughter Douglas (who pt lives with). She is a nurse  Co-Treatment: PT  Co-Treatment Reason: To maximize pt mobility safely  Prior to Session Communication: Bedside nurse  Patient Position Received: Bed, 3 rail up, Alarm on  General Comment: Pt pleasant and agreeable to therapy. Pt reported dizziness with positional changes. Daughter states pt has a history of vertigo, interested in vestibular therapy in Outpatient PT setting  Precautions:  Hearing/Visual Limitations: reading glasses, hearing aids  Medical Precautions: Fall precautions  Objective " "  Cognition:  Orientation Level: Disoriented to time (Increased time required to recall current month. Unable to recall current year, daughter states this is new since admission)   Home Living:  Type of Home: House (in law suite at daughter's house)  Lives With: Adult children  Home Adaptive Equipment: Walker rolling or standard, Cane  Home Layout: Two level, Able to live on main level with bedroom/bathroom  Home Access: Stairs to enter with rails  Entrance Stairs-Rails: Left  Bathroom Shower/Tub: Walk-in shower  Bathroom Toilet: Standard  Bathroom Equipment: Grab bars in shower  Prior Function:  Level of [place]: Independent with ADLs and functional transfers, Independent with homemaking with ambulation  Receives Help From: Family  ADL Assistance: Independent  Homemaking Assistance: Independent  Ambulatory Assistance:  (FWW use at night, cane use if feeling \"dizzy\")  IADL History:  Current License: Yes  ADL:  Eating Assistance: Independent  Grooming Assistance: Stand by  Grooming Deficit: Setup  Bathing Assistance: Minimal  Bathing Deficit: Right lower leg including foot, Left lower leg including foot  UE Dressing Assistance: Stand by  UE Dressing Deficit: Steadying, Supervision/safety  LE Dressing Assistance: Minimal  LE Dressing Deficit: Thread LLE into underwear, Thread RLE into pants, Thread LLE into pants, Thread RLE into underwear, Requires assistive device for steadying, Supervision/safety  Toileting Assistance with Device: Minimal  Toileting Deficit: Perineal hygiene, Steadying  Functional Assistance: Minimal  Activity Tolerance:  Endurance: Tolerates 30 min exercise with multiple rests  Bed Mobility/Transfers: Bed Mobility 1  Bed Mobility 1: Supine to sitting  Level of Assistance 1: Close supervision  Bed Mobility 2  Bed Mobility  2: Sitting to supine  Level of Assistance 2: Minimum assistance    Transfer 1  Transfer From 1: Bed to, Sit to  Transfer to 1: Stand  Technique 1: Sit to stand  Transfer " Device 1: Walker  Transfer Level of Assistance 1: Contact guard  Transfers 2  Transfer From 2: Stand to  Transfer to 2: Sit, Bed  Technique 2: Stand to sit  Transfer Device 2: Walker  Transfer Level of Assistance 2: Contact guard  Transfers 3  Transfer From 3: Bed to, Sit to  Transfer to 3: Chair with arms  Technique 3: Stand pivot  Transfer Device 3: Walker  Transfer Level of Assistance 3: Minimum assistance      Functional Mobility:  Functional Mobility  Functional Mobility Performed: Yes  Functional Mobility 1  Surface 1: Level tile  Device 1: Rolling walker  Assistance 1: Minimum assistance  Sitting Balance:  Dynamic Sitting Balance  Dynamic Sitting-Balance Support: Feet supported  Dynamic Sitting-Level of Assistance: Minimum assistance  Dynamic Sitting-Balance: Trunk control activities  Vision:Vision - Basic Assessment  Current Vision: Wears glasses only for reading  Coordination:  Movements are Fluid and Coordinated: Yes  Coordination Comment: Does have an intermittent tremor in BUEs   Hand Function:     Extremities: RUE   RUE : Within Functional Limits and LUE   LUE: Within Functional Limits    Outcome Measures: [facility] Daily Activity  Putting on and taking off regular lower body clothing: A little  Bathing (including washing, rinsing, drying): A little  Putting on and taking off regular upper body clothing: A little  Toileting, which includes using toilet, bedpan or urinal: A little  Taking care of personal grooming such as brushing teeth: A little  Eating Meals: None  Daily Activity - Total Score: 19    Education Documentation  Body Mechanics, taught by Edith Camargo OT at 10/4/2024 11:04 AM.  Learner: Family, Patient  Readiness: Acceptance  Method: Explanation  Response: Verbalizes Understanding    Precautions, taught by Edith Camargo OT at 10/4/2024 11:04 AM.  Learner: Family, Patient  Readiness: Acceptance  Method: Explanation  Response: Verbalizes Understanding    ADL Training, taught by Edith  CALEB Camargo at 10/4/2024 11:04 AM.  Learner: Family, Patient  Readiness: Acceptance  Method: Explanation  Response: Verbalizes Understanding    Education Comments  No comments found.      Goals:  Encounter Problems       Encounter Problems (Active)       ADLs       Patient with complete upper body dressing with modified independent level of assistance donning and doffing all UE clothes with PRN adaptive equipment while edge of bed        Start:  10/04/24    Expected End:  10/18/24            Patient with complete lower body dressing with supervision level of assistance donning and doffing all LE clothes  with PRN adaptive equipment while edge of bed        Start:  10/04/24    Expected End:  10/18/24            Patient will complete daily grooming tasks brushing teeth and washing face/hair with supervision level of assistance and PRN adaptive equipment while edge of bed .       Start:  10/04/24    Expected End:  10/18/24            Patient will complete toileting including hygiene clothing management/hygiene with stand by assist level of assistance and raised toilet seat and grab bars.       Start:  10/04/24    Expected End:  10/18/24               TRANSFERS       Patient will perform bed mobility stand by assist level of assistance and bed rails in order to improve safety and independence with mobility       Start:  10/04/24    Expected End:  10/18/24            Patient will complete functional transfer to chair with arms with least restrictive device with stand by assist level of assistance.       Start:  10/04/24    Expected End:  10/18/24

## 2024-10-04 NOTE — PROGRESS NOTES
10/04/24 1036   Discharge Planning   Living Arrangements Children  (in law suite at daughter Hetal's home)   Support Systems Children   Assistance Needed A&OX4 (alert to name, location, situation and month); independent with ADLs with walker and cane; drives; room air baseline, on Eliquis prior to hospitalization   Type of Residence Private residence   Number of Stairs to Enter Residence 3   Number of Stairs Within Residence 14   Do you have animals or pets at home? Yes   Type of Animals or Pets daughter has 1 dog   Who is requesting discharge planning? Provider   Home or Post Acute Services Post acute facilities (Rehab/SNF/etc)   Expected Discharge Disposition SNF  (daughter reviewing PAQN list- will need precert for SNF transition)

## 2024-10-05 LAB
ALBUMIN SERPL BCP-MCNC: 3.2 G/DL (ref 3.4–5)
ALP SERPL-CCNC: 59 U/L (ref 33–136)
ALT SERPL W P-5'-P-CCNC: 27 U/L (ref 10–52)
ANION GAP SERPL CALC-SCNC: 11 MMOL/L (ref 10–20)
AST SERPL W P-5'-P-CCNC: 35 U/L (ref 9–39)
BASOPHILS # BLD AUTO: 0.04 X10*3/UL (ref 0–0.1)
BASOPHILS NFR BLD AUTO: 0.7 %
BILIRUB SERPL-MCNC: 0.4 MG/DL (ref 0–1.2)
BUN SERPL-MCNC: 22 MG/DL (ref 6–23)
CALCIUM SERPL-MCNC: 8.8 MG/DL (ref 8.6–10.3)
CHLORIDE SERPL-SCNC: 98 MMOL/L (ref 98–107)
CO2 SERPL-SCNC: 28 MMOL/L (ref 21–32)
CREAT SERPL-MCNC: 1.23 MG/DL (ref 0.5–1.3)
EGFRCR SERPLBLD CKD-EPI 2021: 56 ML/MIN/1.73M*2
EOSINOPHIL # BLD AUTO: 0.22 X10*3/UL (ref 0–0.4)
EOSINOPHIL NFR BLD AUTO: 3.6 %
ERYTHROCYTE [DISTWIDTH] IN BLOOD BY AUTOMATED COUNT: 13.2 % (ref 11.5–14.5)
GLUCOSE SERPL-MCNC: 91 MG/DL (ref 74–99)
HCT VFR BLD AUTO: 40.5 % (ref 41–52)
HGB BLD-MCNC: 13.7 G/DL (ref 13.5–17.5)
IMM GRANULOCYTES # BLD AUTO: 0.03 X10*3/UL (ref 0–0.5)
IMM GRANULOCYTES NFR BLD AUTO: 0.5 % (ref 0–0.9)
LYMPHOCYTES # BLD AUTO: 0.88 X10*3/UL (ref 0.8–3)
LYMPHOCYTES NFR BLD AUTO: 14.5 %
MCH RBC QN AUTO: 29.8 PG (ref 26–34)
MCHC RBC AUTO-ENTMCNC: 33.8 G/DL (ref 32–36)
MCV RBC AUTO: 88 FL (ref 80–100)
MONOCYTES # BLD AUTO: 0.89 X10*3/UL (ref 0.05–0.8)
MONOCYTES NFR BLD AUTO: 14.7 %
NEUTROPHILS # BLD AUTO: 3.99 X10*3/UL (ref 1.6–5.5)
NEUTROPHILS NFR BLD AUTO: 66 %
NRBC BLD-RTO: 0 /100 WBCS (ref 0–0)
PLATELET # BLD AUTO: 187 X10*3/UL (ref 150–450)
POTASSIUM SERPL-SCNC: 3.7 MMOL/L (ref 3.5–5.3)
PROT SERPL-MCNC: 6.5 G/DL (ref 6.4–8.2)
RBC # BLD AUTO: 4.6 X10*6/UL (ref 4.5–5.9)
SODIUM SERPL-SCNC: 133 MMOL/L (ref 136–145)
WBC # BLD AUTO: 6.1 X10*3/UL (ref 4.4–11.3)

## 2024-10-05 PROCEDURE — 2500000001 HC RX 250 WO HCPCS SELF ADMINISTERED DRUGS (ALT 637 FOR MEDICARE OP): Performed by: NURSE PRACTITIONER

## 2024-10-05 PROCEDURE — 2500000002 HC RX 250 W HCPCS SELF ADMINISTERED DRUGS (ALT 637 FOR MEDICARE OP, ALT 636 FOR OP/ED): Performed by: NURSE PRACTITIONER

## 2024-10-05 PROCEDURE — 94760 N-INVAS EAR/PLS OXIMETRY 1: CPT

## 2024-10-05 PROCEDURE — 36415 COLL VENOUS BLD VENIPUNCTURE: CPT | Performed by: STUDENT IN AN ORGANIZED HEALTH CARE EDUCATION/TRAINING PROGRAM

## 2024-10-05 PROCEDURE — 2500000001 HC RX 250 WO HCPCS SELF ADMINISTERED DRUGS (ALT 637 FOR MEDICARE OP)

## 2024-10-05 PROCEDURE — 1200000002 HC GENERAL ROOM WITH TELEMETRY DAILY

## 2024-10-05 PROCEDURE — 99232 SBSQ HOSP IP/OBS MODERATE 35: CPT | Performed by: STUDENT IN AN ORGANIZED HEALTH CARE EDUCATION/TRAINING PROGRAM

## 2024-10-05 PROCEDURE — 80053 COMPREHEN METABOLIC PANEL: CPT | Performed by: STUDENT IN AN ORGANIZED HEALTH CARE EDUCATION/TRAINING PROGRAM

## 2024-10-05 PROCEDURE — 85025 COMPLETE CBC W/AUTO DIFF WBC: CPT | Performed by: STUDENT IN AN ORGANIZED HEALTH CARE EDUCATION/TRAINING PROGRAM

## 2024-10-05 RX ORDER — MIDODRINE HYDROCHLORIDE 5 MG/1
10 TABLET ORAL
Status: DISPENSED | OUTPATIENT
Start: 2024-10-05

## 2024-10-05 ASSESSMENT — COGNITIVE AND FUNCTIONAL STATUS - GENERAL
WALKING IN HOSPITAL ROOM: A LITTLE
DRESSING REGULAR UPPER BODY CLOTHING: A LITTLE
DRESSING REGULAR LOWER BODY CLOTHING: A LOT
DAILY ACTIVITIY SCORE: 19
MOBILITY SCORE: 18
MOVING TO AND FROM BED TO CHAIR: A LITTLE
TOILETING: A LITTLE
STANDING UP FROM CHAIR USING ARMS: A LITTLE
TURNING FROM BACK TO SIDE WHILE IN FLAT BAD: A LITTLE
HELP NEEDED FOR BATHING: A LITTLE
CLIMB 3 TO 5 STEPS WITH RAILING: A LOT

## 2024-10-05 ASSESSMENT — PAIN SCALES - GENERAL
PAINLEVEL_OUTOF10: 0 - NO PAIN
PAINLEVEL_OUTOF10: 0 - NO PAIN

## 2024-10-05 ASSESSMENT — PAIN - FUNCTIONAL ASSESSMENT
PAIN_FUNCTIONAL_ASSESSMENT: 0-10
PAIN_FUNCTIONAL_ASSESSMENT: 0-10

## 2024-10-05 NOTE — PROGRESS NOTES
"Garrison Rodriguez \"Porfirio" is a 89 y.o. male on day 3 of admission presenting with Acute respiratory failure with hypoxia (Multi).      Subjective     Garrison was laying in bed with daughter at bedside. Orthostatic BP was taken and showed laying down 122/74, sitting 103/65, and standing 91/55. He complained of feeling dizzy and light headed with sitting and standing.     Review of systems:  Constitutional: negative for fever, chills, or malaise  Neuro: positive for dizziness, headache, numbness, tingling  ENT: Negative for nasal congestion or sore throat  Resp: negative for shortness of breath, cough, or wheezing  CV: negative for chest pain, palpitations  GI: negative for abd pain, nausea, vomiting or diarrhea  : negative for dysuria, frequency, or urgency  Skin: negative for lesions, wounds, or rash  Musculoskeletal: Negative for weakness, myalgia, or arthralgia  Endocrine: Negative for polyuria or polydipsia    Objective   Constitutional: Well developed, awake/alert/oriented x3, no distress, alert and cooperative  Eyes: PERRL, EOMI, clear sclera  ENMT: mucous membranes moist, no apparent injury, no lesions seen  Head/Neck: Neck supple, no apparent injury, thyroid without mass or tenderness, No JVD, trachea midline, no bruits  Respiratory/Thorax: Patent airways, CTAB, normal breath sounds with good chest expansion, thorax symmetric  Cardiovascular: Regular, rate and rhythm, no murmurs, 2+ equal pulses of the extremities, normal S 1and S 2  Gastrointestinal: Nondistended, soft, non-tender, no rebound tenderness or guarding, no masses palpable, no organomegaly, +BS, no bruits  Musculoskeletal: ROM intact, no joint swelling, normal strength  Extremities: normal extremities, no cyanosis edema, contusions or wounds, no clubbing  Neurological: alert and oriented x3, intact senses, motor, response and reflexes, normal strength  Lymphatic: No significant lymphadenopathy  Psychological: Appropriate mood and behavior  Skin: Warm and " "dry, no lesions, no rashes      Last Recorded Vitals  BP 85/56 (BP Location: Right arm, Patient Position: Standing)   Pulse 62   Temp 36.7 °C (98.1 °F) (Temporal)   Resp 16   Ht 1.778 m (5' 10\")   Wt 90.7 kg (200 lb)   SpO2 92%   BMI 28.70 kg/m²     Intake/Output last 3 Shifts:  I/O last 3 completed shifts:  In: 1560 (17.2 mL/kg) [P.O.:1060; IV Piggyback:500]  Out: 975 (10.7 mL/kg) [Urine:975 (0.3 mL/kg/hr)]  Weight: 90.7 kg   I/O this shift:  In: 310 [P.O.:310]  Out: -     Relevant Results  Scheduled medications  apixaban, 5 mg, oral, BID  cholecalciferol, 2,000 Units, oral, Daily  docusate sodium, 100 mg, oral, BID  escitalopram, 20 mg, oral, Daily  midodrine, 10 mg, oral, TID  primidone, 50 mg, oral, Nightly  rosuvastatin, 10 mg, oral, Daily      Continuous medications     PRN medications  PRN medications: acetaminophen, ipratropium-albuteroL, loperamide, meclizine, melatonin, ondansetron **OR** ondansetron    Results for orders placed or performed during the hospital encounter of 10/02/24 (from the past 24 hour(s))   Comprehensive Metabolic Panel   Result Value Ref Range    Glucose 91 74 - 99 mg/dL    Sodium 133 (L) 136 - 145 mmol/L    Potassium 3.7 3.5 - 5.3 mmol/L    Chloride 98 98 - 107 mmol/L    Bicarbonate 28 21 - 32 mmol/L    Anion Gap 11 10 - 20 mmol/L    Urea Nitrogen 22 6 - 23 mg/dL    Creatinine 1.23 0.50 - 1.30 mg/dL    eGFR 56 (L) >60 mL/min/1.73m*2    Calcium 8.8 8.6 - 10.3 mg/dL    Albumin 3.2 (L) 3.4 - 5.0 g/dL    Alkaline Phosphatase 59 33 - 136 U/L    Total Protein 6.5 6.4 - 8.2 g/dL    AST 35 9 - 39 U/L    Bilirubin, Total 0.4 0.0 - 1.2 mg/dL    ALT 27 10 - 52 U/L   CBC and Auto Differential   Result Value Ref Range    WBC 6.1 4.4 - 11.3 x10*3/uL    nRBC 0.0 0.0 - 0.0 /100 WBCs    RBC 4.60 4.50 - 5.90 x10*6/uL    Hemoglobin 13.7 13.5 - 17.5 g/dL    Hematocrit 40.5 (L) 41.0 - 52.0 %    MCV 88 80 - 100 fL    MCH 29.8 26.0 - 34.0 pg    MCHC 33.8 32.0 - 36.0 g/dL    RDW 13.2 11.5 - 14.5 %    " Platelets 187 150 - 450 x10*3/uL    Neutrophils % 66.0 40.0 - 80.0 %    Immature Granulocytes %, Automated 0.5 0.0 - 0.9 %    Lymphocytes % 14.5 13.0 - 44.0 %    Monocytes % 14.7 2.0 - 10.0 %    Eosinophils % 3.6 0.0 - 6.0 %    Basophils % 0.7 0.0 - 2.0 %    Neutrophils Absolute 3.99 1.60 - 5.50 x10*3/uL    Immature Granulocytes Absolute, Automated 0.03 0.00 - 0.50 x10*3/uL    Lymphocytes Absolute 0.88 0.80 - 3.00 x10*3/uL    Monocytes Absolute 0.89 (H) 0.05 - 0.80 x10*3/uL    Eosinophils Absolute 0.22 0.00 - 0.40 x10*3/uL    Basophils Absolute 0.04 0.00 - 0.10 x10*3/uL       CT head wo IV contrast   Final Result   No evidence of acute cortical infarct or intracranial hemorrhage.        Mild age-related chronic changes.        MACRO:   None        Signed by: Jose Roberto Rashid 10/2/2024 3:19 PM   Dictation workstation:   [id number]      CT angio chest for pulmonary embolism   Final Result   No CT evidence of pulmonary embolism in the current exam.        Evidence of prior granulomatous disease vs. Prior asbestos exposure.   Clinical correlation needed.        Small hiatal hernia.        Cardiomegaly.        Thoracic spine DJD. Slight anterior wedge deformities from T6 through   T8, most likely remote.        Mild aneurysmal dilatation of the ascending aorta as described.        MACRO:   None        Signed by: Parminder George 10/2/2024 2:21 PM   Dictation workstation:   [id number]      XR chest 1 view   Final Result   Hypoventilatory exam.        Bilateral calcified pleural plaques as described.        Stable cardiac pacemaker on the left.        Cardiomegaly with borderline central vascular congestion.        MACRO:   None        Signed by: Parminder George 10/2/2024 12:38 PM   Dictation workstation:   [id number]          No echocardiogram results found for the past 12 months         Assessment/Plan   Principal Problem:    Acute respiratory failure with hypoxia (Multi)      Echo from Sept 4, 2024: LVEF 60%, mild AS/MR/TR,  LVH and abnormal LV relaxation     Dizziness, fatigue  -Had 2 vaccines the other day, poor PO intake  -CT head negative  -Orthostatic VS positive  -EKG showed ventricular paced  -Pacemaker personally interrogated, normal functioning, no arrhythmias  -Magnesium 1.3->supplemented  -CXR showed cardiomegaly with mild vascular congestion  -CTA negative for PE, no effusions or edema  -Given IVF bolus in ER then IV lasix  -Creatinine up to 1.7->IVF bolus given x2  -Creatinine improved to 1.2  -Conts to be orthostatic  -Increase midodrine 10 mg TID  -MIR hose  - Take BP while standing     2. Elevated troponin-Non MI elevation  -Denies ACS symptoms  -EKG V paced, unable to assess for ischemia  -Echo as above  -Cont statin     3. Hyperlipidemia  -Cont statin     4. Acute kidney injury  -Creatinine up to 1.7  -Most likely due to dehydration  -s/p IVF bolus x2  -Creatinine now 1.2  -Monitor     5. Paroxysmal atrial fibrillation  -Currently v paced  -Cont eliquis for CVA prevention  -Not on rate control meds  -Monitor on tele    NICOLE Barry-CNP

## 2024-10-05 NOTE — PROGRESS NOTES
10/05/24 0900   Advance Directives (For Healthcare)   Have you reviewed your Advance Directive and is it valid for this stay? Yes   Advance Directive Patient has advance directive, copy in chart   Type of Healthcare Directive Health care treatment directive;Living will     SW confirmed receipt of POA & Living Will; reviewed contacts updated info.  Copies placed in chart.

## 2024-10-05 NOTE — CARE PLAN
The patient's goals for the shift include      The clinical goals for the shift include pt will ambulate to bathroom with assistance    Over the shift, the patient did not make progress toward the following goals. Barriers to progression include ***. Recommendations to address these barriers include ***.

## 2024-10-05 NOTE — PROGRESS NOTES
"Garrison Rodriguez \"Jean Claude\" is a 89 y.o. male on day 3 of admission presenting with Acute respiratory failure with hypoxia (Multi).    Subjective   Pt denies new complaints.        Objective     Physical Exam  Constitutional: Awake, alert, NAD.  Eyes: PERRLA, EOMI. No erythema or exudate. No proptosis or lid lag.   ENMT: MMM, no nasal congestion, no oral lesions, oropharynx clear without tonsillar erythema or exudate.  Head/Neck: NCAT, neck supple. Full active ROM of the neck. No thyromegaly or mass. No JVP.  Respiratory/Thorax: decreased breath sounds at bilateral lung bases, +rhonchi, no rales/wheezes  Gastrointestinal: Soft, nontender to palpation, nondistended, no guarding or rebound, normoactive bowel sounds  Musculoskeletal: some generalized weakness appreciated  Extremities: 2+ RPs, no cyanosis or edema  Neurological: Aox3, generalized weakness appreciated  Lymphatic: trace edema in lower extremities  Skin: Warm and well perfused.      Last Recorded Vitals:  BP 85/56 (BP Location: Right arm, Patient Position: Standing)   Pulse 62   Temp 36.7 °C (98.1 °F) (Temporal)   Resp 16   Ht 1.778 m (5' 10\")   Wt 90.7 kg (200 lb)   SpO2 92%   BMI 28.70 kg/m²      Scheduled medications:  apixaban, 5 mg, oral, BID  cholecalciferol, 2,000 Units, oral, Daily  docusate sodium, 100 mg, oral, BID  escitalopram, 20 mg, oral, Daily  midodrine, 10 mg, oral, TID  primidone, 50 mg, oral, Nightly  rosuvastatin, 10 mg, oral, Daily      Continuous medications:     PRN medications:  PRN medications: acetaminophen, ipratropium-albuteroL, loperamide, meclizine, melatonin, ondansetron **OR** ondansetron     Relevant Results:  Results for orders placed or performed during the hospital encounter of 10/02/24 (from the past 24 hour(s))   Comprehensive Metabolic Panel   Result Value Ref Range    Glucose 91 74 - 99 mg/dL    Sodium 133 (L) 136 - 145 mmol/L    Potassium 3.7 3.5 - 5.3 mmol/L    Chloride 98 98 - 107 mmol/L    Bicarbonate 28 21 - 32 " mmol/L    Anion Gap 11 10 - 20 mmol/L    Urea Nitrogen 22 6 - 23 mg/dL    Creatinine 1.23 0.50 - 1.30 mg/dL    eGFR 56 (L) >60 mL/min/1.73m*2    Calcium 8.8 8.6 - 10.3 mg/dL    Albumin 3.2 (L) 3.4 - 5.0 g/dL    Alkaline Phosphatase 59 33 - 136 U/L    Total Protein 6.5 6.4 - 8.2 g/dL    AST 35 9 - 39 U/L    Bilirubin, Total 0.4 0.0 - 1.2 mg/dL    ALT 27 10 - 52 U/L   CBC and Auto Differential   Result Value Ref Range    WBC 6.1 4.4 - 11.3 x10*3/uL    nRBC 0.0 0.0 - 0.0 /100 WBCs    RBC 4.60 4.50 - 5.90 x10*6/uL    Hemoglobin 13.7 13.5 - 17.5 g/dL    Hematocrit 40.5 (L) 41.0 - 52.0 %    MCV 88 80 - 100 fL    MCH 29.8 26.0 - 34.0 pg    MCHC 33.8 32.0 - 36.0 g/dL    RDW 13.2 11.5 - 14.5 %    Platelets 187 150 - 450 x10*3/uL    Neutrophils % 66.0 40.0 - 80.0 %    Immature Granulocytes %, Automated 0.5 0.0 - 0.9 %    Lymphocytes % 14.5 13.0 - 44.0 %    Monocytes % 14.7 2.0 - 10.0 %    Eosinophils % 3.6 0.0 - 6.0 %    Basophils % 0.7 0.0 - 2.0 %    Neutrophils Absolute 3.99 1.60 - 5.50 x10*3/uL    Immature Granulocytes Absolute, Automated 0.03 0.00 - 0.50 x10*3/uL    Lymphocytes Absolute 0.88 0.80 - 3.00 x10*3/uL    Monocytes Absolute 0.89 (H) 0.05 - 0.80 x10*3/uL    Eosinophils Absolute 0.22 0.00 - 0.40 x10*3/uL    Basophils Absolute 0.04 0.00 - 0.10 x10*3/uL       No results found.                   Assessment/Plan   Principal Problem:    Acute respiratory failure with hypoxia (Multi)    Acute hypoxic respiratory failure 2/2 HFpEF exacerbation  Back to RA  - cardio following  - hold IV lasix     Orthostatic hypotension  - midodrine  - cardio following     Concern for pneumonia given elevated procal  - augmentin     HTN/HFpEF  - hold BP meds  - statin    A fib  Sp ppm  - eliquis     ADELA   - resolved    Seizure  - primidone    Depression  - escitalopram    Dispo: monitor clinically  Awaiting SNF            Cindi Joyner MD  Hospitalist

## 2024-10-05 NOTE — PROGRESS NOTES
10/05/24 1103   Discharge Planning   Expected Discharge Disposition SNF  ([facility] had no bed and unable to accept at this time. [facility] choice [facility] able to accept and direct precert initiated. Patient and daughter agreeable to plan.)   Does the patient need discharge transport arranged? Yes   RoundTrip coordination needed? Yes   Has discharge transport been arranged? No   Patient Choice   Provider Choice list and CMS website (https://medicare.gov/care-compare#search) for post-acute Quality and Resource Measure Data were provided and reviewed with: Patient;Family   Patient / Family choosing to utilize agency / facility established prior to hospitalization No

## 2024-10-05 NOTE — NURSING NOTE
1900 Assumed care of patient,   2045 assessment as charted, VSS, patient awake in bed, alert and oriented x3. Patient denies pain, nausea, sob or CP. IV intact, bed in lowest and locked position. Bed alarm on and functioning. Call light within reach, safety maintained. Plan of care ongoing     Pt demonstrates ability to turn self in bed without assistance of staff. Patient's family understands importance in prevention of skin breakdown, ulcers, and potential infection. Hourly rounding in effect. RN skin check complete.   Devices in place include: PIV x1, .  Skin assessed under devices: Yes.  Confirmed HAPI identified on the following date: N/A   Location of HAPI: N/A.  Wound Care RN following: Yes.  The following interventions are in place: Aquaphor QID, wound consult placed.

## 2024-10-06 VITALS
WEIGHT: 200 LBS | OXYGEN SATURATION: 97 % | HEART RATE: 60 BPM | TEMPERATURE: 98.2 F | BODY MASS INDEX: 28.63 KG/M2 | SYSTOLIC BLOOD PRESSURE: 129 MMHG | RESPIRATION RATE: 18 BRPM | DIASTOLIC BLOOD PRESSURE: 75 MMHG | HEIGHT: 70 IN

## 2024-10-06 LAB
ALBUMIN SERPL BCP-MCNC: 3.2 G/DL (ref 3.4–5)
ALP SERPL-CCNC: 60 U/L (ref 33–136)
ALT SERPL W P-5'-P-CCNC: 25 U/L (ref 10–52)
ANION GAP SERPL CALC-SCNC: 10 MMOL/L (ref 10–20)
AST SERPL W P-5'-P-CCNC: 28 U/L (ref 9–39)
BASOPHILS # BLD AUTO: 0.05 X10*3/UL (ref 0–0.1)
BASOPHILS NFR BLD AUTO: 0.8 %
BILIRUB SERPL-MCNC: 0.4 MG/DL (ref 0–1.2)
BUN SERPL-MCNC: 19 MG/DL (ref 6–23)
CALCIUM SERPL-MCNC: 8.7 MG/DL (ref 8.6–10.3)
CHLORIDE SERPL-SCNC: 99 MMOL/L (ref 98–107)
CO2 SERPL-SCNC: 28 MMOL/L (ref 21–32)
CREAT SERPL-MCNC: 1.08 MG/DL (ref 0.5–1.3)
EGFRCR SERPLBLD CKD-EPI 2021: 65 ML/MIN/1.73M*2
EOSINOPHIL # BLD AUTO: 0.24 X10*3/UL (ref 0–0.4)
EOSINOPHIL NFR BLD AUTO: 3.7 %
ERYTHROCYTE [DISTWIDTH] IN BLOOD BY AUTOMATED COUNT: 13 % (ref 11.5–14.5)
GLUCOSE SERPL-MCNC: 121 MG/DL (ref 74–99)
HCT VFR BLD AUTO: 39.3 % (ref 41–52)
HGB BLD-MCNC: 13.1 G/DL (ref 13.5–17.5)
IMM GRANULOCYTES # BLD AUTO: 0.04 X10*3/UL (ref 0–0.5)
IMM GRANULOCYTES NFR BLD AUTO: 0.6 % (ref 0–0.9)
LYMPHOCYTES # BLD AUTO: 1.15 X10*3/UL (ref 0.8–3)
LYMPHOCYTES NFR BLD AUTO: 17.6 %
MCH RBC QN AUTO: 29.4 PG (ref 26–34)
MCHC RBC AUTO-ENTMCNC: 33.3 G/DL (ref 32–36)
MCV RBC AUTO: 88 FL (ref 80–100)
MONOCYTES # BLD AUTO: 0.81 X10*3/UL (ref 0.05–0.8)
MONOCYTES NFR BLD AUTO: 12.4 %
NEUTROPHILS # BLD AUTO: 4.24 X10*3/UL (ref 1.6–5.5)
NEUTROPHILS NFR BLD AUTO: 64.9 %
NRBC BLD-RTO: 0 /100 WBCS (ref 0–0)
PLATELET # BLD AUTO: 199 X10*3/UL (ref 150–450)
POTASSIUM SERPL-SCNC: 3.3 MMOL/L (ref 3.5–5.3)
PROT SERPL-MCNC: 6.1 G/DL (ref 6.4–8.2)
RBC # BLD AUTO: 4.45 X10*6/UL (ref 4.5–5.9)
SODIUM SERPL-SCNC: 134 MMOL/L (ref 136–145)
WBC # BLD AUTO: 6.5 X10*3/UL (ref 4.4–11.3)

## 2024-10-06 PROCEDURE — 85025 COMPLETE CBC W/AUTO DIFF WBC: CPT | Performed by: STUDENT IN AN ORGANIZED HEALTH CARE EDUCATION/TRAINING PROGRAM

## 2024-10-06 PROCEDURE — 99232 SBSQ HOSP IP/OBS MODERATE 35: CPT | Performed by: STUDENT IN AN ORGANIZED HEALTH CARE EDUCATION/TRAINING PROGRAM

## 2024-10-06 PROCEDURE — 36415 COLL VENOUS BLD VENIPUNCTURE: CPT | Performed by: STUDENT IN AN ORGANIZED HEALTH CARE EDUCATION/TRAINING PROGRAM

## 2024-10-06 PROCEDURE — 2500000001 HC RX 250 WO HCPCS SELF ADMINISTERED DRUGS (ALT 637 FOR MEDICARE OP)

## 2024-10-06 PROCEDURE — 2500000001 HC RX 250 WO HCPCS SELF ADMINISTERED DRUGS (ALT 637 FOR MEDICARE OP): Performed by: NURSE PRACTITIONER

## 2024-10-06 PROCEDURE — 94760 N-INVAS EAR/PLS OXIMETRY 1: CPT

## 2024-10-06 PROCEDURE — 1200000002 HC GENERAL ROOM WITH TELEMETRY DAILY

## 2024-10-06 PROCEDURE — 2500000002 HC RX 250 W HCPCS SELF ADMINISTERED DRUGS (ALT 637 FOR MEDICARE OP, ALT 636 FOR OP/ED): Performed by: NURSE PRACTITIONER

## 2024-10-06 PROCEDURE — 84075 ASSAY ALKALINE PHOSPHATASE: CPT | Performed by: STUDENT IN AN ORGANIZED HEALTH CARE EDUCATION/TRAINING PROGRAM

## 2024-10-06 ASSESSMENT — COGNITIVE AND FUNCTIONAL STATUS - GENERAL
MOVING TO AND FROM BED TO CHAIR: A LITTLE
HELP NEEDED FOR BATHING: A LITTLE
MOBILITY SCORE: 18
TOILETING: A LITTLE
MOVING FROM LYING ON BACK TO SITTING ON SIDE OF FLAT BED WITH BEDRAILS: A LITTLE
WALKING IN HOSPITAL ROOM: A LITTLE
DRESSING REGULAR UPPER BODY CLOTHING: A LITTLE
PERSONAL GROOMING: A LITTLE
CLIMB 3 TO 5 STEPS WITH RAILING: A LITTLE
TURNING FROM BACK TO SIDE WHILE IN FLAT BAD: A LITTLE
STANDING UP FROM CHAIR USING ARMS: A LITTLE
PERSONAL GROOMING: A LITTLE
STANDING UP FROM CHAIR USING ARMS: A LITTLE
MOVING TO AND FROM BED TO CHAIR: A LITTLE
DRESSING REGULAR LOWER BODY CLOTHING: A LITTLE
CLIMB 3 TO 5 STEPS WITH RAILING: A LITTLE
PERSONAL GROOMING: A LITTLE
MOBILITY SCORE: 18
TOILETING: A LITTLE
STANDING UP FROM CHAIR USING ARMS: A LITTLE
WALKING IN HOSPITAL ROOM: A LITTLE
CLIMB 3 TO 5 STEPS WITH RAILING: A LITTLE
HELP NEEDED FOR BATHING: A LITTLE
TURNING FROM BACK TO SIDE WHILE IN FLAT BAD: A LITTLE
DRESSING REGULAR LOWER BODY CLOTHING: A LITTLE
MOBILITY SCORE: 18
TURNING FROM BACK TO SIDE WHILE IN FLAT BAD: A LITTLE
DAILY ACTIVITIY SCORE: 19
HELP NEEDED FOR BATHING: A LITTLE
MOVING FROM LYING ON BACK TO SITTING ON SIDE OF FLAT BED WITH BEDRAILS: A LITTLE
DAILY ACTIVITIY SCORE: 19
MOVING FROM LYING ON BACK TO SITTING ON SIDE OF FLAT BED WITH BEDRAILS: A LITTLE
DRESSING REGULAR UPPER BODY CLOTHING: A LITTLE
DAILY ACTIVITIY SCORE: 19
DRESSING REGULAR UPPER BODY CLOTHING: A LITTLE
MOVING TO AND FROM BED TO CHAIR: A LITTLE
DRESSING REGULAR LOWER BODY CLOTHING: A LITTLE
TOILETING: A LITTLE
WALKING IN HOSPITAL ROOM: A LITTLE

## 2024-10-06 ASSESSMENT — PAIN SCALES - GENERAL
PAINLEVEL_OUTOF10: 0 - NO PAIN
PAINLEVEL_OUTOF10: 0 - NO PAIN

## 2024-10-06 ASSESSMENT — PAIN - FUNCTIONAL ASSESSMENT: PAIN_FUNCTIONAL_ASSESSMENT: 0-10

## 2024-10-06 NOTE — PROGRESS NOTES
"Garrison Rodriguez \"Jean Claude\" is a 89 y.o. male on day 4 of admission presenting with Acute respiratory failure with hypoxia (Multi).    Subjective   Pt denies new complaints. No overnight events.        Objective     Physical Exam  Constitutional: Awake, alert, NAD.  Eyes: PERRLA, EOMI. No erythema or exudate. No proptosis or lid lag.   ENMT: MMM, no nasal congestion, no oral lesions, oropharynx clear without tonsillar erythema or exudate.  Head/Neck: NCAT, neck supple. Full active ROM of the neck. No thyromegaly or mass. No JVP.  Respiratory/Thorax: decreased breath sounds at bilateral lung bases, +rhonchi, no rales/wheezes  Gastrointestinal: Soft, nontender to palpation, nondistended, no guarding or rebound, normoactive bowel sounds  Musculoskeletal: some generalized weakness appreciated  Extremities: 2+ RPs, no cyanosis or edema  Neurological: Aox3, generalized weakness appreciated  Lymphatic: trace edema in lower extremities  Skin: Warm and well perfused.      Last Recorded Vitals:  /70 (Patient Position: Standing)   Pulse 63   Temp 36.8 °C (98.2 °F) (Temporal)   Resp 18   Ht 1.778 m (5' 10\")   Wt 90.7 kg (200 lb)   SpO2 95%   BMI 28.70 kg/m²      Scheduled medications:  apixaban, 5 mg, oral, BID  cholecalciferol, 2,000 Units, oral, Daily  docusate sodium, 100 mg, oral, BID  escitalopram, 20 mg, oral, Daily  midodrine, 10 mg, oral, TID  primidone, 50 mg, oral, Nightly  rosuvastatin, 10 mg, oral, Daily      Continuous medications:     PRN medications:  PRN medications: acetaminophen, ipratropium-albuteroL, loperamide, meclizine, melatonin, ondansetron **OR** ondansetron     Relevant Results:  Results for orders placed or performed during the hospital encounter of 10/02/24 (from the past 24 hour(s))   Comprehensive Metabolic Panel   Result Value Ref Range    Glucose 121 (H) 74 - 99 mg/dL    Sodium 134 (L) 136 - 145 mmol/L    Potassium 3.3 (L) 3.5 - 5.3 mmol/L    Chloride 99 98 - 107 mmol/L    Bicarbonate 28 21 " - 32 mmol/L    Anion Gap 10 10 - 20 mmol/L    Urea Nitrogen 19 6 - 23 mg/dL    Creatinine 1.08 0.50 - 1.30 mg/dL    eGFR 65 >60 mL/min/1.73m*2    Calcium 8.7 8.6 - 10.3 mg/dL    Albumin 3.2 (L) 3.4 - 5.0 g/dL    Alkaline Phosphatase 60 33 - 136 U/L    Total Protein 6.1 (L) 6.4 - 8.2 g/dL    AST 28 9 - 39 U/L    Bilirubin, Total 0.4 0.0 - 1.2 mg/dL    ALT 25 10 - 52 U/L   CBC and Auto Differential   Result Value Ref Range    WBC 6.5 4.4 - 11.3 x10*3/uL    nRBC 0.0 0.0 - 0.0 /100 WBCs    RBC 4.45 (L) 4.50 - 5.90 x10*6/uL    Hemoglobin 13.1 (L) 13.5 - 17.5 g/dL    Hematocrit 39.3 (L) 41.0 - 52.0 %    MCV 88 80 - 100 fL    MCH 29.4 26.0 - 34.0 pg    MCHC 33.3 32.0 - 36.0 g/dL    RDW 13.0 11.5 - 14.5 %    Platelets 199 150 - 450 x10*3/uL    Neutrophils % 64.9 40.0 - 80.0 %    Immature Granulocytes %, Automated 0.6 0.0 - 0.9 %    Lymphocytes % 17.6 13.0 - 44.0 %    Monocytes % 12.4 2.0 - 10.0 %    Eosinophils % 3.7 0.0 - 6.0 %    Basophils % 0.8 0.0 - 2.0 %    Neutrophils Absolute 4.24 1.60 - 5.50 x10*3/uL    Immature Granulocytes Absolute, Automated 0.04 0.00 - 0.50 x10*3/uL    Lymphocytes Absolute 1.15 0.80 - 3.00 x10*3/uL    Monocytes Absolute 0.81 (H) 0.05 - 0.80 x10*3/uL    Eosinophils Absolute 0.24 0.00 - 0.40 x10*3/uL    Basophils Absolute 0.05 0.00 - 0.10 x10*3/uL       No results found.                   Assessment/Plan   Principal Problem:    Acute respiratory failure with hypoxia (Multi)    Acute hypoxic respiratory failure 2/2 HFpEF exacerbation  Back to RA  - cardio following  - dc IV lasix     Orthostatic hypotension  - midodrine was increased  - repeat orthos  - cardio following     Concern for pneumonia given elevated procal  - augmentin     HTN/HFpEF  - hold BP meds due to orthostatic hypotension  - statin    A fib  Sp ppm  - eliquis     ADELA   - resolved    Seizure  - primidone    Depression  - escitalopram    Dispo: monitor clinically  Awaiting SNF precert            Cindi Joyner MD  Hospitalist

## 2024-10-06 NOTE — CARE PLAN
The patient's goals for the shift include      The clinical goals for the shift include Patient will remain safe and free from falls      Problem: Pain - Adult  Goal: Verbalizes/displays adequate comfort level or baseline comfort level  Outcome: Progressing     Problem: Safety - Adult  Goal: Free from fall injury  Outcome: Progressing     Problem: Discharge Planning  Goal: Discharge to home or other facility with appropriate resources  Outcome: Progressing     Problem: Chronic Conditions and Co-morbidities  Goal: Patient's chronic conditions and co-morbidity symptoms are monitored and maintained or improved  Outcome: Progressing     Problem: Fall/Injury  Goal: Not fall by end of shift  Outcome: Progressing  Goal: Be free from injury by end of the shift  Outcome: Progressing  Goal: Verbalize understanding of personal risk factors for fall in the hospital  Outcome: Progressing  Goal: Verbalize understanding of risk factor reduction measures to prevent injury from fall in the home  Outcome: Progressing  Goal: Use assistive devices by end of the shift  Outcome: Progressing  Goal: Pace activities to prevent fatigue by end of the shift  Outcome: Progressing

## 2024-10-06 NOTE — PROGRESS NOTES
10/06/24 1340   Discharge Planning   Expected Discharge Disposition SNF  ([facility], direct precert started 10/5. Precert still pending 10/6)

## 2024-10-06 NOTE — PROGRESS NOTES
Subjective Data:  Patient currently feels well, was standing and brushing his teeth when I visited and had no cardiopulmonary symptoms.  His blood pressure was taken and was 110/70.    No events overnight.    Overnight Events:    None     Objective Data:  Last Recorded Vitals:  Vitals:    10/06/24 0950 10/06/24 0953 10/06/24 0957 10/06/24 1035   BP: 123/72 112/67 (!) 75/44 111/70   BP Location: Right arm Right arm Right arm    Patient Position: Lying Sitting Standing Standing   Pulse: 61 60 63 63   Resp: 16 16 18    Temp:       TempSrc:       SpO2: 95%  95%    Weight:       Height:           Last Labs:  CBC - 10/6/2024:  7:04 AM  6.5 13.1 199    39.3      CMP - 10/6/2024:  7:04 AM  8.7 6.1 28 --- 0.4   _ 3.2 25 60      PTT - No results in last year.  1.4   15.8 _     TROPHS   Date/Time Value Ref Range Status   10/02/2024 06:39 PM 47 0 - 20 ng/L Final   10/02/2024 12:44 PM 43 0 - 20 ng/L Final   10/02/2024 11:40 AM 38 0 - 20 ng/L Final     BNP   Date/Time Value Ref Range Status   10/02/2024 11:40  0 - 99 pg/mL Final     LDLCALC   Date/Time Value Ref Range Status   01/18/2024 10:43 AM 75 <=99 mg/dL Final     Comment:                                 Near   Borderline      AGE      Desirable  Optimal    High     High     Very High     0-19 Y     0 - 109     ---    110-129   >/= 130     ----    20-24 Y     0 - 119     ---    120-159   >/= 160     ----      >24 Y     0 -  99   100-129  130-159   160-189     >/=190       VLDL   Date/Time Value Ref Range Status   01/18/2024 10:43 AM 12 0 - 40 mg/dL Final   06/12/2021 06:37 AM 18 0 - 40 mg/dL Final   10/13/2020 10:28 AM 12 0 - 40 mg/dL Final   05/14/2019 11:17 AM 14 0 - 40 mg/dL Final      Last I/O:  I/O last 3 completed shifts:  In: 790 (8.7 mL/kg) [P.O.:790]  Out: 150 (1.7 mL/kg) [Urine:150 (0 mL/kg/hr)]  Weight: 90.7 kg     Past Cardiology Tests (Last 3 Years):  EKG:  ECG 12 lead 10/02/2024    Echo:  No results found for this or any previous visit from the past 1095  "days.    Ejection Fractions:  No results found for: \"EF\"  Cath:  No results found for this or any previous visit from the past 1095 days.    Stress Test:  No results found for this or any previous visit from the past 1095 days.    Cardiac Imaging:  No results found for this or any previous visit from the past 1095 days.      Inpatient Medications:  Scheduled medications   Medication Dose Route Frequency    apixaban  5 mg oral BID    cholecalciferol  2,000 Units oral Daily    docusate sodium  100 mg oral BID    escitalopram  20 mg oral Daily    midodrine  10 mg oral TID    primidone  50 mg oral Nightly    rosuvastatin  10 mg oral Daily     PRN medications   Medication    acetaminophen    ipratropium-albuteroL    loperamide    meclizine    melatonin    ondansetron    Or    ondansetron     Continuous Medications   Medication Dose Last Rate       Physical Exam:  Constitutional: Well developed, awake/alert/oriented x3, no distress, alert and cooperative  Eyes: PERRL, EOMI, clear sclera  ENMT: mucous membranes moist, no apparent injury, no lesions seen  Head/Neck: Neck supple, no apparent injury, thyroid without mass or tenderness, No JVD, trachea midline, no bruits  Respiratory/Thorax: Patent airways, CTAB, normal breath sounds with good chest expansion, thorax symmetric  Cardiovascular: Regular, rate and rhythm, no murmurs, 2+ equal pulses of the extremities, normal S 1and S 2  Gastrointestinal: Nondistended, soft, non-tender, no rebound tenderness or guarding, no masses palpable, no organomegaly, +BS, no bruits  Musculoskeletal: ROM intact, no joint swelling, normal strength  Extremities: normal extremities, no cyanosis edema, contusions or wounds, no clubbing  Neurological: alert and oriented x3, intact senses, motor, response and reflexes, normal strength  Lymphatic: No significant lymphadenopathy  Psychological: Appropriate mood and behavior  Skin: Warm and dry, no lesions, no rashes       Assessment/Plan "   Principal Problem:    Acute respiratory failure with hypoxia (Multi)        Echo from Sept 4, 2024: LVEF 60%, mild AS/MR/TR, LVH and abnormal LV relaxation     Dizziness, fatigue  -Had 2 vaccines the other day, poor PO intake  -CT head negative  -Orthostatic VS positive  -EKG showed ventricular paced  -Pacemaker personally interrogated, normal functioning, no arrhythmias  -Magnesium 1.3->supplemented  -CXR showed cardiomegaly with mild vascular congestion  -CTA negative for PE, no effusions or edema  -Given IVF bolus in ER then IV lasix  -Creatinine up to 1.7->IVF bolus given x2  -Increased midodrine 10 mg TID  -MIR hose  - Take BP while standing only     2. Elevated troponin-Non MI elevation  -Denies ACS symptoms  -EKG V paced, unable to assess for ischemia  -Echo as above  -Cont statin     3. Hyperlipidemia  -Cont statin     4. Acute kidney injury resolved  -Creatinine up to 1.7  -Most likely due to dehydration  -s/p IVF bolus x2  -Creatinine now 1.08  -Monitor     5. Paroxysmal atrial fibrillation  -Currently v paced  -Cont eliquis for CVA prevention  -Not on rate control meds  -Monitor on tele    Stable for discharge home, OP follow up  Peripheral IV 10/02/24 20 G Distal;Left;Upper;Anterior Arm (Active)   Site Assessment Clean;Dry;Intact 10/06/24 0900   Dressing Status Clean;Dry 10/06/24 0900   Number of days: 4       Code Status:  Full Code    I spent 20 minutes in the professional and overall care of this patient.        Huy Daily MD

## 2024-10-07 ENCOUNTER — DOCUMENTATION (OUTPATIENT)
Dept: HOME HEALTH SERVICES | Facility: HOME HEALTH | Age: 89
End: 2024-10-07
Payer: MEDICARE

## 2024-10-07 ENCOUNTER — HOME HEALTH ADMISSION (OUTPATIENT)
Dept: HOME HEALTH SERVICES | Facility: HOME HEALTH | Age: 89
End: 2024-10-07
Payer: MEDICARE

## 2024-10-07 ENCOUNTER — PHARMACY VISIT (OUTPATIENT)
Dept: PHARMACY | Facility: CLINIC | Age: 89
End: 2024-10-07
Payer: COMMERCIAL

## 2024-10-07 VITALS
WEIGHT: 200 LBS | RESPIRATION RATE: 18 BRPM | TEMPERATURE: 97 F | HEART RATE: 60 BPM | DIASTOLIC BLOOD PRESSURE: 71 MMHG | HEIGHT: 70 IN | SYSTOLIC BLOOD PRESSURE: 110 MMHG | BODY MASS INDEX: 28.63 KG/M2 | OXYGEN SATURATION: 93 %

## 2024-10-07 PROBLEM — J96.01 ACUTE RESPIRATORY FAILURE WITH HYPOXIA (MULTI): Status: RESOLVED | Noted: 2024-10-02 | Resolved: 2024-10-07

## 2024-10-07 LAB
ALBUMIN SERPL BCP-MCNC: 3.3 G/DL (ref 3.4–5)
ALP SERPL-CCNC: 62 U/L (ref 33–136)
ALT SERPL W P-5'-P-CCNC: 22 U/L (ref 10–52)
ANION GAP SERPL CALC-SCNC: 9 MMOL/L (ref 10–20)
AST SERPL W P-5'-P-CCNC: 22 U/L (ref 9–39)
BASOPHILS # BLD AUTO: 0.06 X10*3/UL (ref 0–0.1)
BASOPHILS NFR BLD AUTO: 0.8 %
BILIRUB SERPL-MCNC: 0.5 MG/DL (ref 0–1.2)
BUN SERPL-MCNC: 17 MG/DL (ref 6–23)
CALCIUM SERPL-MCNC: 8.9 MG/DL (ref 8.6–10.3)
CHLORIDE SERPL-SCNC: 100 MMOL/L (ref 98–107)
CO2 SERPL-SCNC: 30 MMOL/L (ref 21–32)
CREAT SERPL-MCNC: 1.14 MG/DL (ref 0.5–1.3)
EGFRCR SERPLBLD CKD-EPI 2021: 61 ML/MIN/1.73M*2
EOSINOPHIL # BLD AUTO: 0.18 X10*3/UL (ref 0–0.4)
EOSINOPHIL NFR BLD AUTO: 2.5 %
ERYTHROCYTE [DISTWIDTH] IN BLOOD BY AUTOMATED COUNT: 12.9 % (ref 11.5–14.5)
GLUCOSE SERPL-MCNC: 92 MG/DL (ref 74–99)
HCT VFR BLD AUTO: 40.5 % (ref 41–52)
HGB BLD-MCNC: 13.6 G/DL (ref 13.5–17.5)
IMM GRANULOCYTES # BLD AUTO: 0.03 X10*3/UL (ref 0–0.5)
IMM GRANULOCYTES NFR BLD AUTO: 0.4 % (ref 0–0.9)
LYMPHOCYTES # BLD AUTO: 1.35 X10*3/UL (ref 0.8–3)
LYMPHOCYTES NFR BLD AUTO: 18.9 %
MCH RBC QN AUTO: 29.8 PG (ref 26–34)
MCHC RBC AUTO-ENTMCNC: 33.6 G/DL (ref 32–36)
MCV RBC AUTO: 89 FL (ref 80–100)
MONOCYTES # BLD AUTO: 0.88 X10*3/UL (ref 0.05–0.8)
MONOCYTES NFR BLD AUTO: 12.3 %
NEUTROPHILS # BLD AUTO: 4.65 X10*3/UL (ref 1.6–5.5)
NEUTROPHILS NFR BLD AUTO: 65.1 %
NRBC BLD-RTO: 0 /100 WBCS (ref 0–0)
PLATELET # BLD AUTO: 217 X10*3/UL (ref 150–450)
POTASSIUM SERPL-SCNC: 3.6 MMOL/L (ref 3.5–5.3)
PROT SERPL-MCNC: 6.3 G/DL (ref 6.4–8.2)
RBC # BLD AUTO: 4.56 X10*6/UL (ref 4.5–5.9)
SODIUM SERPL-SCNC: 135 MMOL/L (ref 136–145)
WBC # BLD AUTO: 7.2 X10*3/UL (ref 4.4–11.3)

## 2024-10-07 PROCEDURE — 85025 COMPLETE CBC W/AUTO DIFF WBC: CPT | Performed by: STUDENT IN AN ORGANIZED HEALTH CARE EDUCATION/TRAINING PROGRAM

## 2024-10-07 PROCEDURE — 2500000002 HC RX 250 W HCPCS SELF ADMINISTERED DRUGS (ALT 637 FOR MEDICARE OP, ALT 636 FOR OP/ED): Performed by: NURSE PRACTITIONER

## 2024-10-07 PROCEDURE — 2500000001 HC RX 250 WO HCPCS SELF ADMINISTERED DRUGS (ALT 637 FOR MEDICARE OP): Performed by: NURSE PRACTITIONER

## 2024-10-07 PROCEDURE — 2500000001 HC RX 250 WO HCPCS SELF ADMINISTERED DRUGS (ALT 637 FOR MEDICARE OP)

## 2024-10-07 PROCEDURE — 97535 SELF CARE MNGMENT TRAINING: CPT | Mod: GO,CO

## 2024-10-07 PROCEDURE — RXMED WILLOW AMBULATORY MEDICATION CHARGE

## 2024-10-07 PROCEDURE — 80053 COMPREHEN METABOLIC PANEL: CPT | Performed by: STUDENT IN AN ORGANIZED HEALTH CARE EDUCATION/TRAINING PROGRAM

## 2024-10-07 PROCEDURE — 97116 GAIT TRAINING THERAPY: CPT | Mod: GP

## 2024-10-07 PROCEDURE — 99239 HOSP IP/OBS DSCHRG MGMT >30: CPT | Performed by: STUDENT IN AN ORGANIZED HEALTH CARE EDUCATION/TRAINING PROGRAM

## 2024-10-07 PROCEDURE — 97110 THERAPEUTIC EXERCISES: CPT | Mod: GP

## 2024-10-07 PROCEDURE — 36415 COLL VENOUS BLD VENIPUNCTURE: CPT | Performed by: STUDENT IN AN ORGANIZED HEALTH CARE EDUCATION/TRAINING PROGRAM

## 2024-10-07 RX ORDER — MIDODRINE HYDROCHLORIDE 10 MG/1
10 TABLET ORAL
Qty: 30 TABLET | Refills: 0 | Status: SHIPPED | OUTPATIENT
Start: 2024-10-07 | End: 2024-11-06

## 2024-10-07 ASSESSMENT — COGNITIVE AND FUNCTIONAL STATUS - GENERAL
CLIMB 3 TO 5 STEPS WITH RAILING: A LOT
TURNING FROM BACK TO SIDE WHILE IN FLAT BAD: A LITTLE
DRESSING REGULAR LOWER BODY CLOTHING: A LITTLE
TURNING FROM BACK TO SIDE WHILE IN FLAT BAD: A LITTLE
STANDING UP FROM CHAIR USING ARMS: A LITTLE
WALKING IN HOSPITAL ROOM: A LITTLE
CLIMB 3 TO 5 STEPS WITH RAILING: A LITTLE
DAILY ACTIVITIY SCORE: 21
MOVING TO AND FROM BED TO CHAIR: A LITTLE
TOILETING: A LITTLE
DAILY ACTIVITIY SCORE: 19
PERSONAL GROOMING: A LITTLE
TOILETING: A LITTLE
MOVING TO AND FROM BED TO CHAIR: A LITTLE
HELP NEEDED FOR BATHING: A LITTLE
DRESSING REGULAR LOWER BODY CLOTHING: A LITTLE
MOBILITY SCORE: 19
DRESSING REGULAR UPPER BODY CLOTHING: A LITTLE
STANDING UP FROM CHAIR USING ARMS: A LITTLE
HELP NEEDED FOR BATHING: A LITTLE
WALKING IN HOSPITAL ROOM: A LITTLE
MOBILITY SCORE: 18

## 2024-10-07 ASSESSMENT — PAIN - FUNCTIONAL ASSESSMENT
PAIN_FUNCTIONAL_ASSESSMENT: 0-10
PAIN_FUNCTIONAL_ASSESSMENT: 0-10

## 2024-10-07 ASSESSMENT — PAIN SCALES - GENERAL
PAINLEVEL_OUTOF10: 0 - NO PAIN
PAINLEVEL_OUTOF10: 0 - NO PAIN

## 2024-10-07 ASSESSMENT — ACTIVITIES OF DAILY LIVING (ADL): HOME_MANAGEMENT_TIME_ENTRY: 10

## 2024-10-07 NOTE — PROGRESS NOTES
10/07/24 1056   Discharge Planning   Living Arrangements Children  (in law suite at daughter Hetal's home)   Support Systems Children   Assistance Needed A&OX4 (alert to name, location, situation and month); independent with ADLs with walker and cane; drives; room air baseline, on Eliquis prior to hospitalization   Type of Residence Private residence   Number of Stairs to Enter Residence 3   Number of Stairs Within Residence 14   Do you have animals or pets at home? Yes   Type of Animals or Pets daughter has 1 dog   Who is requesting discharge planning? Provider   Home or Post Acute Services In home services   Expected Discharge Disposition Home H  (patient and daughter have changed preference for discharge to home with new  homecare for PT only)   Does the patient need discharge transport arranged? Yes   RoundTrip coordination needed? Yes   Has discharge transport been arranged? No

## 2024-10-07 NOTE — HH CARE COORDINATION
Home Care received a Referral for Physical Therapy. We have processed the referral for a Start of Care on 10/08-10/09.     If you have any questions or concerns, please feel free to contact us at [phone]. Follow the prompts, enter your five digit zip code, and you will be directed to your care team on [facility] 1.

## 2024-10-07 NOTE — PROGRESS NOTES
Subjective Data:  He is sitting up in the chair, states he is still dizzy but better than before.     No events overnight.    Overnight Events:    None     Objective Data:  Last Recorded Vitals:  Vitals:    10/06/24 2000 10/07/24 0608 10/07/24 0927 10/07/24 1444   BP: 129/75 133/82 96/57 103/65   BP Location: Right arm Right arm Right arm Left arm   Patient Position: Lying Lying Lying Lying   Pulse: 60 61 60 60   Resp: 18 18 18 18   Temp: 36.8 °C (98.2 °F) 36.6 °C (97.9 °F) 36.4 °C (97.5 °F) 36.1 °C (97 °F)   TempSrc: Temporal Temporal Temporal Temporal   SpO2: 97% 95% 94% 93%   Weight:       Height:           Last Labs:  CBC - 10/7/2024:  6:08 AM  7.2 13.6 217    40.5      CMP - 10/7/2024:  6:08 AM  8.9 6.3 22 --- 0.5   _ 3.3 22 62      PTT - No results in last year.  1.4   15.8 _     TROPHS   Date/Time Value Ref Range Status   10/02/2024 06:39 PM 47 0 - 20 ng/L Final   10/02/2024 12:44 PM 43 0 - 20 ng/L Final   10/02/2024 11:40 AM 38 0 - 20 ng/L Final     BNP   Date/Time Value Ref Range Status   10/02/2024 11:40  0 - 99 pg/mL Final     LDLCALC   Date/Time Value Ref Range Status   01/18/2024 10:43 AM 75 <=99 mg/dL Final     Comment:                                 Near   Borderline      AGE      Desirable  Optimal    High     High     Very High     0-19 Y     0 - 109     ---    110-129   >/= 130     ----    20-24 Y     0 - 119     ---    120-159   >/= 160     ----      >24 Y     0 -  99   100-129  130-159   160-189     >/=190       VLDL   Date/Time Value Ref Range Status   01/18/2024 10:43 AM 12 0 - 40 mg/dL Final   06/12/2021 06:37 AM 18 0 - 40 mg/dL Final   10/13/2020 10:28 AM 12 0 - 40 mg/dL Final   05/14/2019 11:17 AM 14 0 - 40 mg/dL Final      Last I/O:  I/O last 3 completed shifts:  In: 484 (5.3 mL/kg) [P.O.:484]  Out: 100 (1.1 mL/kg) [Urine:100 (0 mL/kg/hr)]  Weight: 90.7 kg     Past Cardiology Tests (Last 3 Years):  EKG:  ECG 12 lead 10/02/2024    Echo:  No results found for this or any previous visit  "from the past 1095 days.    Ejection Fractions:  No results found for: \"EF\"  Cath:  No results found for this or any previous visit from the past 1095 days.    Stress Test:  No results found for this or any previous visit from the past 1095 days.    Cardiac Imaging:  No results found for this or any previous visit from the past 1095 days.      Inpatient Medications:  Scheduled medications   Medication Dose Route Frequency    apixaban  5 mg oral BID    cholecalciferol  2,000 Units oral Daily    docusate sodium  100 mg oral BID    escitalopram  20 mg oral Daily    midodrine  10 mg oral TID    primidone  50 mg oral Nightly    rosuvastatin  10 mg oral Daily     PRN medications   Medication    acetaminophen    ipratropium-albuteroL    loperamide    meclizine    melatonin    ondansetron    Or    ondansetron     Continuous Medications   Medication Dose Last Rate       Physical Exam:  Constitutional: Well developed, awake/alert/oriented x3, no distress, alert and cooperative  Eyes: PERRL, EOMI, clear sclera  ENMT: mucous membranes moist, no apparent injury, no lesions seen  Head/Neck: Neck supple, no apparent injury, thyroid without mass or tenderness, No JVD, trachea midline, no bruits  Respiratory/Thorax: Patent airways, CTAB, normal breath sounds with good chest expansion, thorax symmetric  Cardiovascular: Regular, rate and rhythm, no murmurs, 2+ equal pulses of the extremities, normal S 1and S 2  Gastrointestinal: Nondistended, soft, non-tender, no rebound tenderness or guarding, no masses palpable, no organomegaly, +BS, no bruits  Musculoskeletal: ROM intact, no joint swelling, normal strength  Extremities: normal extremities, no cyanosis edema, contusions or wounds, no clubbing  Neurological: alert and oriented x3, intact senses, motor, response and reflexes, normal strength  Lymphatic: No significant lymphadenopathy  Psychological: Appropriate mood and behavior  Skin: Warm and dry, no lesions, no rashes     "   Assessment/Plan   Principal Problem:    Acute respiratory failure with hypoxia (Multi)        Echo from Sept 4, 2024: LVEF 60%, mild AS/MR/TR, LVH and abnormal LV relaxation     Dizziness, fatigue  -Had 2 vaccines the other day, poor PO intake  -CT head negative  -Orthostatic VS positive  -EKG showed ventricular paced  -Pacemaker personally interrogated, normal functioning, no arrhythmias  -Magnesium 1.3->supplemented  -CXR showed cardiomegaly with mild vascular congestion  -CTA negative for PE, no effusions or edema  -Given IVF bolus in ER then IV lasix  -Creatinine up to 1.7->IVF bolus given x2  -Increased midodrine 10 mg TID  -MIR hose  - Take BP while standing only     2. Elevated troponin-Non MI elevation  -Denies ACS symptoms  -EKG V paced, unable to assess for ischemia  -Echo as above  -Cont statin     3. Hyperlipidemia  -Cont statin     4. Acute kidney injury resolved  -Creatinine up to 1.7  -Most likely due to dehydration  -s/p IVF bolus x2  -Creatinine now 1.08  -Monitor     5. Paroxysmal atrial fibrillation  -Currently v paced  -Cont eliquis for CVA prevention  -Not on rate control meds  -Monitor on tele    Stable for discharge home, OP follow up  Peripheral IV 10/02/24 20 G Distal;Left;Upper;Anterior Arm (Active)   Site Assessment Clean;Dry;Intact 10/06/24 0900   Dressing Status Clean;Dry 10/06/24 0900   Number of days: 4       Code Status:  Full Code    I spent 20 minutes in the professional and overall care of this patient.        Joanne Randhawa, APRN-CNP

## 2024-10-07 NOTE — DISCHARGE SUMMARY
Discharge Diagnosis  Acute respiratory failure with hypoxia (Multi)    Issues Requiring Follow-Up  F/U with PCP, Cardiology    Discharge Meds     Medication List      START taking these medications     midodrine 10 mg tablet; Commonly known as: Proamatine; Take 1 tablet (10   mg) by mouth 3 times daily (morning, midday, late afternoon). Hold for   SBP>120     CONTINUE taking these medications     acetaminophen 500 mg tablet; Commonly known as: Tylenol   B complex-vitamin C tablet   Centrum Silver Men 711--300 mcg tablet; Generic drug:   mv-min-folic-K1-lycopen-lutein   cholecalciferol 50 mcg (2,000 unit) capsule; Commonly known as: Vitamin   D-3   cyanocobalamin (vitamin B-12) 3,000 mcg tablet, sublingual   Eliquis 5 mg tablet; Generic drug: apixaban   escitalopram 20 mg tablet; Commonly known as: Lexapro; TAKE 1 & 1/2   TABLETS BY MOUTH EVERY DAY   Imodium A-D 2 mg tablet; Generic drug: loperamide   meclizine 25 mg tablet; Commonly known as: Antivert   primidone 50 mg tablet; Commonly known as: Mysoline; Take 1 tablet (50   mg) by mouth once daily at bedtime.   rosuvastatin 10 mg tablet; Commonly known as: Crestor   VITAMIN C ORAL   ZINC ORAL     STOP taking these medications     sulfamethoxazole-trimethoprim 800-160 mg tablet; Commonly known as:   Bactrim DS       Test Results Pending At Discharge  Pending Labs       No current pending labs.          CT Head:  Impression:     No evidence of acute cortical infarct or intracranial hemorrhage.      Mild age-related chronic changes.      CT Angio Chest for Pulmonary Embolism:  Impression:     No CT evidence of pulmonary embolism in the current exam.      Evidence of prior granulomatous disease vs. Prior asbestos exposure.  Clinical correlation needed.      Small hiatal hernia.      Cardiomegaly.      Thoracic spine DJD. Slight anterior wedge deformities from T6 through  T8, most likely remote.      Mild aneurysmal dilatation of the ascending aorta as described.       CXR:   Impression:     Hypoventilatory exam.      Bilateral calcified pleural plaques as described.      Stable cardiac pacemaker on the left.      Cardiomegaly with borderline central vascular congestion.        Hospital Course  89 y.o. male with a medical history of HTN, PAF, and Aortic Stenosis who presented to [facility] ED with c/o weakness, dizziness, and shortness of breath. In the ED, . Troponin 38>43. Magnesium 1.38. CT Head negative for acute findings. No PE on CT Angio. CXR with some vascular congestion. Admitted for acute hypoxic respiratory failure with generalized weakness. Possibly secondary to acute CHF vs. Infectious/viral infection, or immune response to recent vaccines (COVID/Influenza on 10/1/24). , CXR with vascular congestion. Afebrile. No leukocytosis. Negative for Influenza/COVID on admission. Was given Lasix for diuresis for heart failure; however, after 1 dose creatinine increased and appeared dry on exam and was having dizziness with + orthostatic hypotension. Consult Cardiology - established patient of Dr. Aguirre. Recent Echocardiogram (9/4/24): LVEF 60%, mild aortic stenosis. Given several bolus of IVF fluid with improvement. Weaned to RA w/o difficulty. Cardiology ordered Midodrine and MIR hose for othostatic hypotension. Mobility, strength improved. Hemodynamically stable for discharge to home.     Medical Management:      CV: HTN, HFpEF, PAF, s/p PPM  -Orthostatic hypotension - give IVF bolus; midodrine ordered by cardiology; MIR hose.   -Troponin elevation: 38>43>47  -Suspect demand ischemia in setting of acute hypoxic respiratory failure  -No ACS symptoms  -Monitored on telemetry  -On Apixaban      Acute Kidney Injury - Resolved  -Suspect this is related to hydration, Lasix  -Avoided nephrotoxins and gave gentle hydration  -Creatinine improved: 1.7>1.21>1.14.      Dizziness  -Has history of vertigo  -Per recent cardiology note was found to be hypotensive and  hypovolemic  -On Meclizine PRN  -See above for management of orthostatic hypotension     Depression  -On Escitalopram     DVT Prophylaxis  -On Apixaban (A.Fib)     Fluids/Electrolytes/Nutrition  -Laboratory data reviewed.   -Electrolytes stable. Hyponatremia - chronic.   -No nutritional concerns at this time.       Disposition  Plan of care discussed with attending, Dr. Joyner, Cardiology APRN. Seen and examined in his room this AM. Awake and alert. Ambulates with walker and reports resolution of acute weakness. He denies chest pain, breathing difficulties, abdominal pain, N/V/D/C, fever, or chills.  Medications reviewed and reconciled. Scripts prepared as needed. Greater than 35 minutes spent in coordination of care, including physical examination, chart review, medication reconciliation, collaboration with providers, staff, and family.     Pertinent Physical Exam At Time of Discharge  Physical Exam  Constitutional: A&O x 3; NAD; calm and cooperative  Eyes: EOM's intact  HEENT: Normocephalic, Atraumatic. Oral mucosa moist.   Neck: Supple. No significant JVD, lymphadenopathy.   Lungs: CTAB with fair air movement. Respirations even and unlabored on room air.   Heart: RRR; + murmur.  Abdomen: Softly distended, non-tender, +BS  MS/Extremities: MORGAN equally x 4. No significant edema. Peripheral pulses intact bilaterally. Chronic vascular changes to lower extremities.   Neuro: A&O x3; no focal deficits; gross motor and sensation intact.   Skin: Warm and dry. No rashes or lesions  Psych: Normal affect.      Outpatient Follow-Up  No future appointments.      Rhianna Andersen, APRN-CNP

## 2024-10-07 NOTE — CARE PLAN
The patient's goals for the shift include  be able to get some rest     The clinical goals for the shift include pt will continue to have no pain

## 2024-10-07 NOTE — PROGRESS NOTES
"Physical Therapy    Physical Therapy Treatment    Patient Name: Garrison Rodriguez \"Jean Claude\"  MRN: [id number]  Department: [address]  Room: [address] [place] [address]  Today's Date: 10/7/2024  Time Calculation  Start Time: 1112  Stop Time: 1137  Time Calculation (min): 25 min         Assessment/Plan   PT Assessment  Rehab Prognosis: Good  Evaluation/Treatment Tolerance: Patient tolerated treatment well  Medical Staff Made Aware: Yes  Strengths: Ability to acquire knowledge, Attitude of self, Living arrangement secure, Support of Caregivers, Premorbid level of function  End of Session Communication: Bedside nurse  End of Session Patient Position: Alarm on, Up in chair  PT Plan  Inpatient/Swing Bed or Outpatient: Inpatient  PT Plan  Treatment/Interventions: Bed mobility, Transfer training, Gait training, Therapeutic exercise  PT Plan: Ongoing PT  PT Frequency: 3 times per week  PT Discharge Recommendations: Moderate intensity level of continued care  Equipment Recommended upon Discharge: Wheeled walker  PT Recommended Transfer Status: Assist x1  PT - OK to Discharge: Yes      General Visit Information:   PT  Visit  PT Received On: 10/07/24  Response to Previous Treatment: Patient with no complaints from previous session.  General  Reason for Referral: 89 YOM admitted with weakness, dizziness, SOB > UTI and acute respiratory failure  Referred By: NICOLE Franco-CNP  Past Medical History Relevant to Rehab: PMH:  HTN, PAF, and Aortic Stenosis  Family/Caregiver Present: No  Prior to Session Communication: Bedside nurse  Patient Position Received: Bed, 3 rail up, Alarm on  General Comment: Pt pleasant and agreeable to therapy. Pt reported dizziness with when attempted ambulation without glasses - resolved when glasses donned    Subjective   Precautions:  Precautions  Hearing/Visual Limitations: reading glasses, hearing aids  Medical Precautions: Fall precautions      Objective   Pain:  Pain Assessment  Pain Assessment: 0-10  0-10 (Numeric) Pain " Score: 0 - No pain  Cognition:  Cognition  Overall Cognitive Status: Within Functional Limits    Activity Tolerance:  Activity Tolerance  Endurance: Tolerates 30 min exercise with multiple rests  Early Mobility/Exercise Safety Screen: Proceed with mobilization - No exclusion criteria met  Treatments:  Therapeutic Exercise  Therapeutic Exercise Performed: Yes  Therapeutic Exercise Activity 1: Standing BLE ther ex with BUE support on FWW: toe raises - unable to complete d/t posterior LOB (lifted whole foot off floor slightly instead x 10),  heel raises x10, hip flex with knee flex x 10.    Bed Mobility  Bed Mobility: Yes  Bed Mobility 1  Bed Mobility 1: Supine to sitting  Level of Assistance 1: Close supervision    Ambulation/Gait Training  Ambulation/Gait Training Performed: Yes  Ambulation/Gait Training 1  Surface 1: Level tile  Device 1: Rolling walker  Assistance 1: Contact guard  Quality of Gait 1: Narrow base of support, Decreased step length, Forward flexed posture  Comments/Distance (ft) 1: 50 ft without glasses - pt. reported dizziness.  Standing rest once glasses donned.  Dizziness resolved.  Continued with ambulation 100 ft with FWW, cues to lift head to observe environment to safely negotiate obstacles.  Transfers  Transfer: Yes  Transfer 1  Transfer From 1: Bed to, Sit to  Transfer to 1: Stand  Technique 1: Sit to stand  Transfer Device 1: Walker  Transfer Level of Assistance 1: Contact guard  Trials/Comments 1: multiple trials, verbal cues for hand placement and safe technique using FWW  Transfers 2  Transfer From 2: Stand to  Transfer to 2: Sit, Chair with arms  Technique 2: Stand to sit  Transfer Device 2: Walker  Transfer Level of Assistance 2: Contact guard    Outcome Measures:  [facility] Basic Mobility  Turning from your back to your side while in a flat bed without using bedrails: None  Moving from lying on your back to sitting on the side of a flat bed without using bedrails: A little  Moving to and  from bed to chair (including a wheelchair): A little  Standing up from a chair using your arms (e.g. wheelchair or bedside chair): A little  To walk in hospital room: A little  Climbing 3-5 steps with railing: A lot  Basic Mobility - Total Score: 18    Education Documentation  Home Exercise Program, taught by Taylor Scales, PT at 10/7/2024 12:07 PM.  Learner: Patient  Readiness: Acceptance  Method: Explanation, Demonstration  Response: Verbalizes Understanding    Mobility Training, taught by Taylor Scales, PT at 10/7/2024 12:07 PM.  Learner: Patient  Readiness: Acceptance  Method: Explanation, Demonstration  Response: Verbalizes Understanding    Education Comments  No comments found.        OP EDUCATION:       Encounter Problems       Encounter Problems (Active)       Balance       STG - Maintains dynamic standing balance with upper extremity support, close supervision, no LOB, ~5 mins (Progressing)       Start:  10/04/24    Expected End:  10/18/24            STG - Maintains static standing balance with upper extremity support, close supervision, no retrolean, no LOB, ~2mins (Progressing)       Start:  10/04/24    Expected End:  10/18/24               Mobility       STG - Patient will ambulate at least 25'x2, LRAD, close supervision (Progressing)       Start:  10/04/24    Expected End:  10/18/24               PT Transfers       STG - Patient to transfer to and from sit to supine independently  (Progressing)       Start:  10/04/24    Expected End:  10/18/24            STG - Patient will transfer sit to and from stand with LRAD, close supervision (Progressing)       Start:  10/04/24    Expected End:  10/18/24

## 2024-10-07 NOTE — PROGRESS NOTES
"Occupational Therapy    Occupational Therapy Treatment    Name: Garrison Rodriguez \"Jean Claude\"  MRN: [id number]  Department: [address]  Room: [address] [place] [address]  Date: 10/07/24  Time Calculation  Start Time: 1325  Stop Time: 1335  Time Calculation (min): 10 min    Assessment:  OT Assessment: 89 y/o patient continues to demonstrate decreased activity tolerence, impaired ADL and selfcare. Pt demonstrates continued benefit from skilled OT treatment.  Prognosis: Good  Barriers to Discharge: None  Evaluation/Treatment Tolerance: Patient tolerated treatment well, Patient limited by fatigue  Medical Staff Made Aware: Yes  End of Session Communication: Bedside nurse (Discussed assist needed in transfers and selfcare components.)  End of Session Patient Position: Bed, 3 rail up, Alarm on  Plan:  Treatment Interventions: ADL retraining, Functional transfer training, Patient/family training, Compensatory technique education  OT Frequency: 3 times per week  OT Discharge Recommendations: Moderate intensity level of continued care  Equipment Recommended upon Discharge: Wheeled walker  OT Recommended Transfer Status: Stand by assist  OT - OK to Discharge: Yes (In accord with OT POC)    Subjective   Previous Visit Info:  OT Last Visit  OT Received On: 10/07/24  General:  General  Reason for Referral: 89 YOM admitted with weakness, dizziness, SOB > UTI and acute respiratory failure  Referred By: Rhianna Andersen, APRN-CNP  Past Medical History Relevant to Rehab: PMH:  HTN, PAF, and Aortic Stenosis  Family/Caregiver Present: No  Prior to Session Communication: Bedside nurse  Patient Position Received: Bed, 3 rail up, Alarm on  Preferred Learning Style: verbal  General Comment: Pt pleasant and cooperative, with treatment limited to short session to accomodate discharge this afternoon.  Precautions:  Medical Precautions: Fall precautions (IV)    Vital Signs (Past 2hrs)            Pain Assessment:  Pain Assessment  Pain Assessment: 0-10  0-10 (Numeric) Pain Score: " 0 - No pain     Objective   Cognition:  Overall Cognitive Status: Within Functional Limits  Orientation Level: Disoriented to time  Activities of Daily Living: Toileting  Toileting Level of Assistance: Setup, Close supervision  Where Assessed: Toilet  Toileting Comments: increased time needed for clothing management and hygiene.     Bed Mobility/Transfers: Bed Mobility  Bed Mobility: Yes  Bed Mobility 1  Bed Mobility 1: Sitting to supine  Level of Assistance 1: Close supervision    Transfers  Transfer: Yes  Transfer 1  Transfer From 1: Commode-standard to, Stand to  Transfer to 1: Sit, Stand  Technique 1: Sit to stand, Stand to sit  Transfer Device 1: Walker  Transfer Level of Assistance 1: Contact guard  Trials/Comments 1: Reminders for safe transition hands to/from device.    Functional Mobility:  Functional Mobility  Functional Mobility Performed: Yes  Functional Mobility 1  Surface 1: Level tile  Device 1: Rolling walker  Assistance 1: Contact guard, Minimal verbal cues  Comments 1: bathroom toilet to bed    Outcome Measures:  [facility] Daily Activity  Putting on and taking off regular lower body clothing: A little  Bathing (including washing, rinsing, drying): A little  Putting on and taking off regular upper body clothing: A little  Toileting, which includes using toilet, bedpan or urinal: A little  Taking care of personal grooming such as brushing teeth: A little  Eating Meals: None  Daily Activity - Total Score: 19    Education Documentation  Body Mechanics, taught by Rohan LISA at 10/7/2024 13:24 PM.  Learner: Family, Patient  Readiness: Acceptance  Method: Explanation  Response: Verbalizes Understanding     Precautions, taught by Rohan LISA at 10/7/2024 13:24 PM.  Learner: Family, Patient  Readiness: Acceptance  Method: Explanation  Response: Verbalizes Understanding     ADL Training, taught by Rohan LISA at 10/7/2024 13:24 PM.  Learner: Family, Patient  Readiness: Acceptance  Method:  Explanation  Response: Verbalizes Understanding     Education Comments  Pt compliant to education provided in course of session.    Goals:  Encounter Problems       Encounter Problems (Active)       ADLs       Patient with complete upper body dressing with modified independent level of assistance donning and doffing all UE clothes with PRN adaptive equipment while edge of bed  (Progressing)       Start:  10/04/24    Expected End:  10/18/24            Patient with complete lower body dressing with supervision level of assistance donning and doffing all LE clothes  with PRN adaptive equipment while edge of bed  (Progressing)       Start:  10/04/24    Expected End:  10/18/24            Patient will complete daily grooming tasks brushing teeth and washing face/hair with supervision level of assistance and PRN adaptive equipment while edge of bed . (Progressing)       Start:  10/04/24    Expected End:  10/18/24            Patient will complete toileting including hygiene clothing management/hygiene with stand by assist level of assistance and raised toilet seat and grab bars. (Progressing)       Start:  10/04/24    Expected End:  10/18/24               TRANSFERS       Patient will perform bed mobility stand by assist level of assistance and bed rails in order to improve safety and independence with mobility (Progressing)       Start:  10/04/24    Expected End:  10/18/24            Patient will complete functional transfer to chair with arms with least restrictive device with stand by assist level of assistance. (Progressing)       Start:  10/04/24    Expected End:  10/18/24

## 2024-10-08 ENCOUNTER — PATIENT OUTREACH (OUTPATIENT)
Dept: PRIMARY CARE | Facility: CLINIC | Age: 89
End: 2024-10-08
Payer: MEDICARE

## 2024-10-08 NOTE — PROGRESS NOTES
Discharge Facility: [facility]  Discharge Diagnosis: Acute respiratory failure with hypoxia   Admission Date: 10/2/2024  Discharge Date: 10/7/2024    PCP Appointment Date: 10/29/2024 3:40 PM  Specialist Appointment Date: Cardiology recommended  Hospital Encounter and Summary Linked: Yes  See discharge assessment below for further details    Medications  Medications reviewed with patient/caregiver?: Yes (Daughter, Hetal) (10/8/2024 11:39 AM)  Is the patient having any side effects they believe may be caused by any medication additions or changes?: No (10/8/2024 11:39 AM)  Does the patient have all medications ordered at discharge?: Yes (10/8/2024 11:39 AM)  Care Management Interventions: No intervention needed (10/8/2024 11:39 AM)  Prescription Comments: NEW: midodrine (10/8/2024 11:39 AM)  Is the patient taking all medications as directed (includes completed medication regime)?: Yes (10/8/2024 11:39 AM)  Care Management Interventions: Provided patient education (10/8/2024 11:39 AM)  Medication Comments: Verbalized understanding of medication changes. (10/8/2024 11:39 AM)    Appointments  Does the patient have a primary care provider?: Yes (10/8/2024 11:39 AM)  Care Management Interventions: Verified appointment date/time/provider (10/8/2024 11:39 AM)  Has the patient kept scheduled appointments due by today?: Not applicable (10/8/2024 11:39 AM)  Care Management Interventions: Advised to schedule with specialist (Cardiology) (10/8/2024 11:39 AM)    Self Management  What is the home health agency?: [facility] (10/8/2024 11:39 AM)  Has home health visited the patient within 72 hours of discharge?: Call prior to 72 hours (Start of care scheduled for 10/11/2024 wtih PT.) (10/8/2024 11:39 AM)  What Durable Medical Equipment (DME) was ordered?: N/A (10/8/2024 11:39 AM)    Patient Teaching  Does the patient have access to their discharge instructions?: Yes (Reviewed with daughter. Advised checking BP prior to  giving midodrine and holding for systolic BP (top number) over 120. Daughter stated the family has a BP cuff and she will be bringing it to the home.) (10/8/2024 11:39 AM)  What is the patient's perception of their health status since discharge?: Improving (Per daughter, patient is doing better. SOB improved. Ambulating in home. Getting around better than expected. Daughter to assist with shower.) (10/8/2024 11:39 AM)  Is the patient/caregiver able to teach back the hierarchy of who to call/visit for symptoms/problems? PCP, Specialist, Home Health nurse, Urgent Care, ED, 911: Yes (10/8/2024 11:39 AM)  Patient/Caregiver Education Comments: Patient verbalized understanding of discharge instructions. Provided contact information for nonurgent questions or concerns. (10/8/2024 11:39 AM)    Wrap Up  Wrap Up Additional Comments: 89yoF with PMHx of HTN, PAF, and aortic stenosis who presented to [facility] ED with c/o weakness, dizziness, and shortness of breath. Admitted for acute hypoxic respiratory failure with generalized weakness. Possibly secondary to acute CHF vs. infectious/viral infection, or immune response to recent vaccines (COVID/Influenza on 10/1/24). Patient diuresed with IV lasix and became hypostensive and dizzy. Cardiology consulted. Thought to possibly be dehydrated. Several IVF boluses given. Patient discharged to home once stabilized with [facility] to follow. Rx for midodrine given and compression stockings recommended. (10/8/2024 11:39 AM)

## 2024-10-09 NOTE — SIGNIFICANT EVENT
Follow Up Phone Call    Outgoing phone call    Spoke to: Garrison Rodriguez Relationship:self   Phone number: [phone]      Outcome: contacted patient/ family   Chief Complaint   Patient presents with    Weakness, Gen          Diagnosis:Not applicable    States he is feeling better. No further questions or concerns.

## 2024-10-11 ENCOUNTER — HOME CARE VISIT (OUTPATIENT)
Dept: HOME HEALTH SERVICES | Facility: HOME HEALTH | Age: 89
End: 2024-10-11
Payer: MEDICARE

## 2024-10-11 VITALS — TEMPERATURE: 97.6 F | DIASTOLIC BLOOD PRESSURE: 54 MMHG | HEART RATE: 62 BPM | SYSTOLIC BLOOD PRESSURE: 86 MMHG

## 2024-10-11 PROCEDURE — 169592 NO-PAY CLAIM PROCEDURE

## 2024-10-11 PROCEDURE — G0151 HHCP-SERV OF PT,EA 15 MIN: HCPCS | Mod: HHH

## 2024-10-11 SDOH — HEALTH STABILITY: PHYSICAL HEALTH: EXERCISE COMMENTS: STANDING TOE RAISES HEEL RAISES SIDESTEPPING AT COUNTER

## 2024-10-11 ASSESSMENT — BALANCE ASSESSMENTS
EYES CLOSED AT MAXIMUM POSITION NUDGED: 0 - UNSTEADY
NUDGED SCORE: 0
NUDGED: 0 - BEGINS TO FALL
ARISING SCORE: 1
IMMEDIATE STANDING BALANCE FIRST 5 SECONDS: 0 - UNSTEADY (STAGGERS, MOVES FEET, TRUNK SWAY)
SITTING DOWN: 1 - USES ARMS OR NOT SMOOTH MOTION
STANDING BALANCE: 1 - STEADY BUT WIDE STANCE AND USES CANE OR OTHER SUPPORT
TURNING 360 DEGREES STEPS: 1 - CONTINUOUS STEPS
ARISES: 1 - ABLE, USES ARMS TO HELP
BALANCE SCORE: 7
ATTEMPTS TO ARISE: 1 - ABLE, REQUIRES MORE THAN ONE ATTEMPT
SITTING BALANCE: 1 - STEADY, SAFE

## 2024-10-11 ASSESSMENT — ACTIVITIES OF DAILY LIVING (ADL)
ENTERING_EXITING_HOME: CONTACT GUARD ASSIST
OASIS_M1830: 03

## 2024-10-11 ASSESSMENT — GAIT ASSESSMENTS
TRUNK SCORE: 0
PATH: 1 - MILD/MODERATE DEVIATION OR USES WALKING AID
TRUNK: 0 - MARKED SWAY OR USES WALKING AID
PATH SCORE: 1
GAIT SCORE: 8
WALKING STANCE: 0 - HEELS APART
STEP SYMMETRY: 1 - RIGHT AND LEFT STEP LENGTH APPEAR EQUAL
STEP CONTINUITY: 1 - STEPS APPEAR CONTINUOUS
INITIATION OF GAIT IMMEDIATELY AFTER GO: 1 - NO HESITANCY
BALANCE AND GAIT SCORE: 15

## 2024-10-11 ASSESSMENT — ENCOUNTER SYMPTOMS
OCCASIONAL FEELINGS OF UNSTEADINESS: 1
DEPRESSION: 0
LOSS OF SENSATION IN FEET: 0
DENIES PAIN: 1

## 2024-10-16 ENCOUNTER — HOME CARE VISIT (OUTPATIENT)
Dept: HOME HEALTH SERVICES | Facility: HOME HEALTH | Age: 89
End: 2024-10-16
Payer: MEDICARE

## 2024-10-16 VITALS
TEMPERATURE: 97.7 F | OXYGEN SATURATION: 97 % | SYSTOLIC BLOOD PRESSURE: 115 MMHG | HEART RATE: 77 BPM | DIASTOLIC BLOOD PRESSURE: 55 MMHG

## 2024-10-16 PROCEDURE — G0157 HHC PT ASSISTANT EA 15: HCPCS | Mod: HHH

## 2024-10-16 SDOH — HEALTH STABILITY: PHYSICAL HEALTH: EXERCISE ACTIVITIES SETS: 1

## 2024-10-16 SDOH — HEALTH STABILITY: PHYSICAL HEALTH: PHYSICAL EXERCISE: SEATED

## 2024-10-16 SDOH — HEALTH STABILITY: PHYSICAL HEALTH: EXERCISE TYPE: BLE EXS

## 2024-10-16 SDOH — HEALTH STABILITY: PHYSICAL HEALTH: PHYSICAL EXERCISE: 10

## 2024-10-16 SDOH — HEALTH STABILITY: PHYSICAL HEALTH: PHYSICAL EXERCISE: STANDING

## 2024-10-16 SDOH — HEALTH STABILITY: PHYSICAL HEALTH: EXERCISE ACTIVITY: LAQS

## 2024-10-16 SDOH — HEALTH STABILITY: PHYSICAL HEALTH: EXERCISE ACTIVITY: CALF RAISES, MARCHES, HIP ABD/EXT, MINI SQUATS

## 2024-10-16 ASSESSMENT — ACTIVITIES OF DAILY LIVING (ADL)
PHYSICAL TRANSFERS ASSESSED: 1
AMBULATION ASSISTANCE: 1
AMBULATION ASSISTANCE ON FLAT SURFACES: 1
PHYSICAL_TRANSFERS_DEVICES: LBQC
AMBULATION_DISTANCE/DURATION_TOLERATED: 80'
AMBULATION ASSISTANCE: SUPERVISION
CURRENT_FUNCTION: SUPERVISION

## 2024-10-16 ASSESSMENT — ENCOUNTER SYMPTOMS: DENIES PAIN: 1

## 2024-10-18 ENCOUNTER — HOSPITAL ENCOUNTER (EMERGENCY)
Facility: HOSPITAL | Age: 89
Discharge: HOME | End: 2024-10-18
Attending: STUDENT IN AN ORGANIZED HEALTH CARE EDUCATION/TRAINING PROGRAM
Payer: MEDICARE

## 2024-10-18 ENCOUNTER — HOME CARE VISIT (OUTPATIENT)
Dept: HOME HEALTH SERVICES | Facility: HOME HEALTH | Age: 89
End: 2024-10-18
Payer: MEDICARE

## 2024-10-18 ENCOUNTER — APPOINTMENT (OUTPATIENT)
Dept: RADIOLOGY | Facility: HOSPITAL | Age: 89
End: 2024-10-18
Payer: MEDICARE

## 2024-10-18 VITALS
SYSTOLIC BLOOD PRESSURE: 95 MMHG | DIASTOLIC BLOOD PRESSURE: 65 MMHG | RESPIRATION RATE: 17 BRPM | HEIGHT: 71 IN | BODY MASS INDEX: 30.94 KG/M2 | HEART RATE: 66 BPM | TEMPERATURE: 97.9 F | WEIGHT: 221 LBS | OXYGEN SATURATION: 95 %

## 2024-10-18 DIAGNOSIS — E86.0 DEHYDRATION: ICD-10-CM

## 2024-10-18 DIAGNOSIS — I95.9 HYPOTENSION, UNSPECIFIED HYPOTENSION TYPE: Primary | ICD-10-CM

## 2024-10-18 DIAGNOSIS — N39.0 URINARY TRACT INFECTION WITHOUT HEMATURIA, SITE UNSPECIFIED: ICD-10-CM

## 2024-10-18 LAB
ABO GROUP (TYPE) IN BLOOD: NORMAL
ALBUMIN SERPL BCP-MCNC: 3.8 G/DL (ref 3.4–5)
ALP SERPL-CCNC: 69 U/L (ref 33–136)
ALT SERPL W P-5'-P-CCNC: 14 U/L (ref 10–52)
ANION GAP BLDV CALCULATED.4IONS-SCNC: 12 MMOL/L (ref 10–25)
ANION GAP SERPL CALC-SCNC: 15 MMOL/L (ref 10–20)
ANTIBODY SCREEN: NORMAL
APPEARANCE UR: CLEAR
APTT PPP: 35 SECONDS (ref 27–38)
AST SERPL W P-5'-P-CCNC: 21 U/L (ref 9–39)
BASE EXCESS BLDV CALC-SCNC: 3.9 MMOL/L (ref -2–3)
BASOPHILS # BLD AUTO: 0.1 X10*3/UL (ref 0–0.1)
BASOPHILS NFR BLD AUTO: 1.2 %
BILIRUB SERPL-MCNC: 0.6 MG/DL (ref 0–1.2)
BILIRUB UR STRIP.AUTO-MCNC: NEGATIVE MG/DL
BNP SERPL-MCNC: 332 PG/ML (ref 0–99)
BODY TEMPERATURE: ABNORMAL
BUN SERPL-MCNC: 23 MG/DL (ref 6–23)
CA-I BLDV-SCNC: 1.27 MMOL/L (ref 1.1–1.33)
CALCIUM SERPL-MCNC: 9.2 MG/DL (ref 8.6–10.3)
CARDIAC TROPONIN I PNL SERPL HS: 15 NG/L (ref 0–20)
CHLORIDE BLDV-SCNC: 99 MMOL/L (ref 98–107)
CHLORIDE SERPL-SCNC: 99 MMOL/L (ref 98–107)
CK SERPL-CCNC: 49 U/L (ref 0–325)
CO2 SERPL-SCNC: 29 MMOL/L (ref 21–32)
COLOR UR: YELLOW
CREAT SERPL-MCNC: 1.52 MG/DL (ref 0.5–1.3)
EGFRCR SERPLBLD CKD-EPI 2021: 43 ML/MIN/1.73M*2
EOSINOPHIL # BLD AUTO: 0.15 X10*3/UL (ref 0–0.4)
EOSINOPHIL NFR BLD AUTO: 1.9 %
ERYTHROCYTE [DISTWIDTH] IN BLOOD BY AUTOMATED COUNT: 13.2 % (ref 11.5–14.5)
FLUAV RNA RESP QL NAA+PROBE: NOT DETECTED
FLUBV RNA RESP QL NAA+PROBE: NOT DETECTED
GLUCOSE BLDV-MCNC: 107 MG/DL (ref 74–99)
GLUCOSE SERPL-MCNC: 90 MG/DL (ref 74–99)
GLUCOSE UR STRIP.AUTO-MCNC: NORMAL MG/DL
HCO3 BLDV-SCNC: 30.6 MMOL/L (ref 22–26)
HCT VFR BLD AUTO: 42.2 % (ref 41–52)
HCT VFR BLD EST: 46 % (ref 41–52)
HGB BLD-MCNC: 13.9 G/DL (ref 13.5–17.5)
HGB BLDV-MCNC: 15.3 G/DL (ref 13.5–17.5)
IMM GRANULOCYTES # BLD AUTO: 0.03 X10*3/UL (ref 0–0.5)
IMM GRANULOCYTES NFR BLD AUTO: 0.4 % (ref 0–0.9)
INHALED O2 CONCENTRATION: 21 %
INR PPP: 1.3 (ref 0.9–1.1)
KETONES UR STRIP.AUTO-MCNC: NEGATIVE MG/DL
LACTATE BLDV-SCNC: 1.8 MMOL/L (ref 0.4–2)
LEUKOCYTE ESTERASE UR QL STRIP.AUTO: ABNORMAL
LYMPHOCYTES # BLD AUTO: 1.58 X10*3/UL (ref 0.8–3)
LYMPHOCYTES NFR BLD AUTO: 19.7 %
MAGNESIUM SERPL-MCNC: 1.47 MG/DL (ref 1.6–2.4)
MCH RBC QN AUTO: 29.8 PG (ref 26–34)
MCHC RBC AUTO-ENTMCNC: 32.9 G/DL (ref 32–36)
MCV RBC AUTO: 90 FL (ref 80–100)
MONOCYTES # BLD AUTO: 0.76 X10*3/UL (ref 0.05–0.8)
MONOCYTES NFR BLD AUTO: 9.5 %
MUCOUS THREADS #/AREA URNS AUTO: ABNORMAL /LPF
NEUTROPHILS # BLD AUTO: 5.4 X10*3/UL (ref 1.6–5.5)
NEUTROPHILS NFR BLD AUTO: 67.3 %
NITRITE UR QL STRIP.AUTO: NEGATIVE
NRBC BLD-RTO: 0 /100 WBCS (ref 0–0)
OXYHGB MFR BLDV: 48.1 % (ref 45–75)
PCO2 BLDV: 53 MM HG (ref 41–51)
PH BLDV: 7.37 PH (ref 7.33–7.43)
PH UR STRIP.AUTO: 5.5 [PH]
PLATELET # BLD AUTO: 249 X10*3/UL (ref 150–450)
PO2 BLDV: 32 MM HG (ref 35–45)
POTASSIUM BLDV-SCNC: 4.4 MMOL/L (ref 3.5–5.3)
POTASSIUM SERPL-SCNC: 4.2 MMOL/L (ref 3.5–5.3)
PROT SERPL-MCNC: 7.1 G/DL (ref 6.4–8.2)
PROT UR STRIP.AUTO-MCNC: ABNORMAL MG/DL
PROTHROMBIN TIME: 15.2 SECONDS (ref 9.8–12.8)
RBC # BLD AUTO: 4.67 X10*6/UL (ref 4.5–5.9)
RBC # UR STRIP.AUTO: ABNORMAL /UL
RBC #/AREA URNS AUTO: ABNORMAL /HPF
RH FACTOR (ANTIGEN D): NORMAL
SAO2 % BLDV: 49 % (ref 45–75)
SARS-COV-2 RNA RESP QL NAA+PROBE: NOT DETECTED
SODIUM BLDV-SCNC: 137 MMOL/L (ref 136–145)
SODIUM SERPL-SCNC: 139 MMOL/L (ref 136–145)
SP GR UR STRIP.AUTO: 1.01
SQUAMOUS #/AREA URNS AUTO: ABNORMAL /HPF
UROBILINOGEN UR STRIP.AUTO-MCNC: NORMAL MG/DL
WBC # BLD AUTO: 8 X10*3/UL (ref 4.4–11.3)
WBC #/AREA URNS AUTO: ABNORMAL /HPF
WBC CLUMPS #/AREA URNS AUTO: ABNORMAL /HPF

## 2024-10-18 PROCEDURE — 99284 EMERGENCY DEPT VISIT MOD MDM: CPT | Mod: 25

## 2024-10-18 PROCEDURE — 87077 CULTURE AEROBIC IDENTIFY: CPT | Mod: GEALAB | Performed by: STUDENT IN AN ORGANIZED HEALTH CARE EDUCATION/TRAINING PROGRAM

## 2024-10-18 PROCEDURE — 84075 ASSAY ALKALINE PHOSPHATASE: CPT

## 2024-10-18 PROCEDURE — 82435 ASSAY OF BLOOD CHLORIDE: CPT

## 2024-10-18 PROCEDURE — 83880 ASSAY OF NATRIURETIC PEPTIDE: CPT

## 2024-10-18 PROCEDURE — 96367 TX/PROPH/DG ADDL SEQ IV INF: CPT

## 2024-10-18 PROCEDURE — 84484 ASSAY OF TROPONIN QUANT: CPT

## 2024-10-18 PROCEDURE — 85025 COMPLETE CBC W/AUTO DIFF WBC: CPT

## 2024-10-18 PROCEDURE — 36415 COLL VENOUS BLD VENIPUNCTURE: CPT

## 2024-10-18 PROCEDURE — 87636 SARSCOV2 & INF A&B AMP PRB: CPT

## 2024-10-18 PROCEDURE — 86901 BLOOD TYPING SEROLOGIC RH(D): CPT

## 2024-10-18 PROCEDURE — 81001 URINALYSIS AUTO W/SCOPE: CPT | Performed by: STUDENT IN AN ORGANIZED HEALTH CARE EDUCATION/TRAINING PROGRAM

## 2024-10-18 PROCEDURE — 70450 CT HEAD/BRAIN W/O DYE: CPT

## 2024-10-18 PROCEDURE — 87086 URINE CULTURE/COLONY COUNT: CPT | Mod: GEALAB | Performed by: STUDENT IN AN ORGANIZED HEALTH CARE EDUCATION/TRAINING PROGRAM

## 2024-10-18 PROCEDURE — 85610 PROTHROMBIN TIME: CPT

## 2024-10-18 PROCEDURE — 71045 X-RAY EXAM CHEST 1 VIEW: CPT

## 2024-10-18 PROCEDURE — 96365 THER/PROPH/DIAG IV INF INIT: CPT

## 2024-10-18 PROCEDURE — 2500000004 HC RX 250 GENERAL PHARMACY W/ HCPCS (ALT 636 FOR OP/ED)

## 2024-10-18 PROCEDURE — 70450 CT HEAD/BRAIN W/O DYE: CPT | Performed by: STUDENT IN AN ORGANIZED HEALTH CARE EDUCATION/TRAINING PROGRAM

## 2024-10-18 PROCEDURE — 71045 X-RAY EXAM CHEST 1 VIEW: CPT | Performed by: RADIOLOGY

## 2024-10-18 PROCEDURE — 82550 ASSAY OF CK (CPK): CPT

## 2024-10-18 PROCEDURE — 36415 COLL VENOUS BLD VENIPUNCTURE: CPT | Performed by: STUDENT IN AN ORGANIZED HEALTH CARE EDUCATION/TRAINING PROGRAM

## 2024-10-18 PROCEDURE — 83735 ASSAY OF MAGNESIUM: CPT

## 2024-10-18 PROCEDURE — 2500000001 HC RX 250 WO HCPCS SELF ADMINISTERED DRUGS (ALT 637 FOR MEDICARE OP)

## 2024-10-18 RX ORDER — CEPHALEXIN 500 MG/1
500 CAPSULE ORAL 4 TIMES DAILY
Qty: 40 CAPSULE | Refills: 0 | Status: SHIPPED | OUTPATIENT
Start: 2024-10-18 | End: 2024-10-28

## 2024-10-18 RX ORDER — MIDODRINE HYDROCHLORIDE 10 MG/1
10 TABLET ORAL ONCE
Status: COMPLETED | OUTPATIENT
Start: 2024-10-18 | End: 2024-10-18

## 2024-10-18 RX ORDER — CEFTRIAXONE 1 G/50ML
1 INJECTION, SOLUTION INTRAVENOUS ONCE
Status: COMPLETED | OUTPATIENT
Start: 2024-10-18 | End: 2024-10-18

## 2024-10-18 RX ORDER — MAGNESIUM SULFATE 1 G/100ML
1 INJECTION INTRAVENOUS ONCE
Status: COMPLETED | OUTPATIENT
Start: 2024-10-18 | End: 2024-10-18

## 2024-10-18 ASSESSMENT — COLUMBIA-SUICIDE SEVERITY RATING SCALE - C-SSRS
1. IN THE PAST MONTH, HAVE YOU WISHED YOU WERE DEAD OR WISHED YOU COULD GO TO SLEEP AND NOT WAKE UP?: NO
6. HAVE YOU EVER DONE ANYTHING, STARTED TO DO ANYTHING, OR PREPARED TO DO ANYTHING TO END YOUR LIFE?: NO
2. HAVE YOU ACTUALLY HAD ANY THOUGHTS OF KILLING YOURSELF?: NO

## 2024-10-18 ASSESSMENT — LIFESTYLE VARIABLES
EVER FELT BAD OR GUILTY ABOUT YOUR DRINKING: NO
EVER HAD A DRINK FIRST THING IN THE MORNING TO STEADY YOUR NERVES TO GET RID OF A HANGOVER: NO
TOTAL SCORE: 0
HAVE PEOPLE ANNOYED YOU BY CRITICIZING YOUR DRINKING: NO
HAVE YOU EVER FELT YOU SHOULD CUT DOWN ON YOUR DRINKING: NO

## 2024-10-18 ASSESSMENT — PAIN SCALES - GENERAL
PAINLEVEL_OUTOF10: 0 - NO PAIN
PAINLEVEL_OUTOF10: 0 - NO PAIN

## 2024-10-18 ASSESSMENT — PAIN - FUNCTIONAL ASSESSMENT
PAIN_FUNCTIONAL_ASSESSMENT: 0-10
PAIN_FUNCTIONAL_ASSESSMENT: 0-10

## 2024-10-18 NOTE — DISCHARGE INSTRUCTIONS
Please return to the emergency department, should you have any worsening symptoms, are unable to get an appointment with your primary care physician within the discussed time frame, or have any further concerns.     Please follow up with: Primary care physician as needed.  And cardiologist as scheduled.  Please call Monday to make an appointment.    You may take ibuprofen or tylenol for pain. You may alternate ibuprofen and tylenol every 6 hours for better pain control.    Do not exceed 2400mg of ibuprofen or 4000mg of tylenol in a 24 hour period.

## 2024-10-18 NOTE — PROGRESS NOTES
"Pharmacy Medication History Review    Garrison Rodriguez \"Jack\" is a 89 y.o. male admitted for No Principal Problem: There is no principal problem currently on the Problem List. Please update the Problem List and refresh.. Pharmacy reviewed the patient's [id number] medications and allergies for accuracy.    The list below reflectives the updated PTA list. Please review each medication in order reconciliation for additional clarification and justification.  Prior to Admission Medications   Prescriptions Last Dose Informant Patient Reported? Taking?   ZINC ORAL 10/17/2024 Child Yes Yes   Sig: Take 1 capsule by mouth once daily.   acetaminophen (Tylenol) 500 mg tablet Unknown Child Yes Unknown   Sig: Take 1 tablet (500 mg) by mouth every 6 hours if needed.   apixaban (Eliquis) 5 mg tablet 10/18/2024 Morning Child Yes Yes   Sig: Take 1 tablet (5 mg) by mouth 2 times a day.   ascorbic acid (VITAMIN C ORAL) 10/17/2024 Child Yes Yes   Sig: Take 1 capsule by mouth once daily. 500 mg   b complex-vitamin c tablet 10/17/2024 Child Yes Yes   Sig: Take 1 tablet by mouth once daily. 2500 mcg   cholecalciferol (Vitamin D-3) 50 mcg (2,000 unit) capsule 10/17/2024 Child Yes Yes   Sig: Take 1 capsule (50 mcg) by mouth early in the morning..   cyanocobalamin, vitamin B-12, 3,000 mcg tablet, sublingual 10/17/2024 Child Yes Yes   Sig: Take 1 tablet by mouth once daily.   escitalopram (Lexapro) 20 mg tablet 10/18/2024 Morning Child Yes Yes   Sig: TAKE 1 & 1/2 TABLETS BY MOUTH EVERY DAY   loperamide (Imodium A-D) 2 mg tablet Unknown Child Yes Unknown   Sig: Take 1 tablet (2 mg) by mouth if needed.   meclizine (Antivert) 25 mg tablet Unknown Child Yes Unknown   Sig: Take 1 tablet (25 mg) by mouth once daily as needed for dizziness.   midodrine (Proamatine) 10 mg tablet 10/18/2024 Morning Child Yes Yes   Sig: Take 1 tablet (10 mg) by mouth 3 times daily (morning, midday, late afternoon). Hold for SBP>120   mv-min-folic-K1-lycopen-lutein " (Centrum Silver Men) 812--300 mcg tablet 10/17/2024 Child Yes Yes   Sig: Take by mouth once daily.   primidone (Mysoline) 50 mg tablet 10/17/2024 Evening Child Yes Yes   Sig: Take 1 tablet (50 mg) by mouth once daily at bedtime.   Patient taking differently: Take 1 tablet (50 mg) by mouth once daily at bedtime. 0.5 tab   rosuvastatin (Crestor) 10 mg tablet 10/17/2024 Evening Child Yes Yes   Sig: Take 1 tablet (10 mg) by mouth once daily.      Facility-Administered Medications: None           The list below reflectives the updated allergy list. Please review each documented allergy for additional clarification and justification.  Allergies  Reviewed by Bijal Gray RN on 10/18/2024   No Known Allergies         Below are additional concerns with the patient's PTA list.      Nisreen Bailey

## 2024-10-18 NOTE — ED PROVIDER NOTES
Emergency Department Provider Note        History of Present Illness     History provided by: Patient  Limitations to History: None  External Records Reviewed with Brief Summary:  Discharge summary from 10/7/2024 where patient was admitted for acute hypoxemic respiratory failure in the setting of acute on chronic heart failure.  Noted significant orthostatic hypotension, started on midodrine and eventually discharged home.    HPI:  Garrison Rodriguez is a 89 y.o. male with prior history of HTN, HFpEF, atrial fibrillation on anticoagulation now with pacemaker presenting to the emergency department with an episode of hypotension while at home.  Noted to have blood pressures in the 80s systolics, and were associated with dizziness, thus presented to the emergency department for further evaluation.  He declines any episodes of syncope, no head injuries or loss of consciousness.  Notes that when he has these episodes of low blood pressure he often feels very dizzy and diffusely weak, however did not have any episodes since arriving to the emergency department.  He currently takes midodrine, and had a scheduled follow-up appointment today, however given his dizziness he was unable to make it to that appointment.  When his daughter called his physician to let them know, they advised that he present to the emergency department for further evaluation.  He declines any other symptoms at this time.  No fevers, chills, chest pain, shortness of breath, nausea, vomiting, abdominal pain, or any urinary symptoms.    Physical Exam   Triage vitals:  T 37.4 °C (99.3 °F)  HR 63  BP (!) 69/47  RR 18  O2 94 % None (Room air)    GEN:  A&Ox3, mentating appropriately, resting with eyes closed, wakes to voice easily, no acute distress, appears comfortable. Conversational and appropriate.    HEENT: Normocephalic, atraumatic. Conjunctiva pink with no redness or exudates. Hearing grossly intact. Moist mucous membranes.  PERRLA, EOMI.  CARDIO:  Normal rate and regular rhythm. Normal S1, S2  without murmurs, rubs, or gallops.   PULM: Clear to auscultation bilaterally. No rales, rhonchi, or wheezes. No accessory muscle use or stridor.  GI: Soft, non-tender, non-distended. No rebound tenderness or guarding.   SKIN: Warm and dry, no rashes, lesions, petechiae, or purpura.  MSK: ROM intact in all 4 extremities without contractures or pain.  Mild peripheral edema. No contusions, or wounds.    NEURO: No focal findings identified. No confusion or gross mental status changes.  PSYCH: Appropriate mood and behavior, converses and responds appropriately during exam.    Medical Decision Making & ED Course   Medical Decision Makin y.o. male with prior history of HTN, HFpEF, atrial fibrillation on anticoagulation now with pacemaker presenting to the emergency department with hypotension, which appear to be acute on chronic, likely in the setting of dehydration as noted by his daughter at bedside who states that he does not drink enough fluids.  Lab work was significant for ADELA, which is consistent with daughters reported history of poor p.o. intake.  Magnesium was also noted to be low and was replaced while in the ED.  BNP was mildly elevated although improved from patient's previous baseline, and was minimally fluid overloaded on exam.  EKG did not show any acute ischemic findings to suggest cardiac cause of dizziness.  Given IV fluids and midodrine while here in the ED with notable improvement of his symptoms.  He remained without any acute episodes of dizziness while in the ED.  Low suspicion for influenza or COVID given negative testing.  Urinalysis was sent given patient's noted dizziness, and demonstrated cystitis.  Was given dose of ceftriaxone while here in the ED.  I discussed these findings with patient and daughter at bedside, will give prescription for Keflex.  Patient was able to ambulate throughout the emergency department without any difficulty, uses  a walker at bedside and lives with his daughter who is a PACU nurse and feels comfortable taking him home.  They were both given strict return precautions if he has any further episodes of dizziness, or if he has any additional symptoms such as fevers, chills, chest pain, abdominal pain, headaches, or any changes in his vision.  Both voiced understanding and are comfortable plan for discharge home.  Discharged in stable condition.    ----      Differential diagnoses considered include but are not limited to: Hypotension, vertigo, poor p.o. intake, dehydration, electrolyte derangement, UTI, COVID-19, influenza     Social Determinants of Health which Significantly Impact Care: None identified     EKG Independent Interpretation: EKG interpreted by myself. Please see ED Course for full interpretation.    Independent Result Review and Interpretation: Relevant laboratory and radiographic results were reviewed and independently interpreted by myself.  As necessary, they are commented on in the ED Course.    Chronic conditions affecting the patient's care: As documented above in [facility]    The patient was discussed with the following consultants/services: None    Care Considerations: As documented above in [facility]    ED Course:  ED Course as of 10/19/24 2232   Fri Oct 18, 2024   1810 Cxr shows No consolidation. Diffuse reticular interstitial pulmonary opacities  similar to prior exam. Calcified pleural plaques left lung. No  pleural effusion or pneumothorax. Stable cardiomegaly. Left-sided  cardiac device. Atherosclerosis. No acute osseous abnormality.      IMPRESSION:  Stable diffuse reticular interstitial pulmonary opacities. No adverse  interval change.   [WL]   1824 Pt has been having hypotension for about 3 weeks. Pt weak and feels dizzy. [WL]   1824 POCT Lactate, Venous: 1.8 [WL]   1836 BNP(!): 332 [AD]   1927 MAGNESIUM(!): 1.47 [WL]   1952 Coronavirus 2019, PCR: Not Detected [AD]   1952 Flu B Result: Not Detected [AD]   1952  Flu A Result: Not Detected [AD]   2000 Leukocyte Esterase, Urine(!): 500 Eliu/µL [AD]   2000 WBC, Urine(!): 21-50  Will give ceftriaxone  [AD]   2016 WBC, Urine(!): 21-50 [WL]   2016 Leukocyte Esterase, Urine(!): 500 Eliu/µL [WL]   2016 Creatinine(!): 1.52 [WL]   2018 Was given Keflex to go home.  Able to ambulate here without any difficulty.  Also found to have ADELA for which she was given fluid rehydration.  I think his symptoms all from decreased fluid intake.  He does not believe so but family at bedside states he does not drink enough.  They are comfortable with this plan to discharge home and close follow-up with their cardiologist as they missed their appointment today. [WL]      ED Course User Index  [AD] Nena Stiles DO  [WL] Gomez Britton DO         Diagnoses as of 10/19/24 2232   Hypotension, unspecified hypotension type   Dehydration   Urinary tract infection without hematuria, site unspecified     Disposition   As a result of the work-up, the patient was discharged home.  he was informed of his diagnosis and instructed to come back with any concerns or worsening of condition.  he and was agreeable to the plan as discussed above.  he was given the opportunity to ask questions.  All of the patient's questions were answered.    Procedures   Procedures    Patient seen and discussed with ED attending physician.    Nena Stiles DO  Emergency Medicine       Nena Stiles DO  Resident  10/19/24 2232

## 2024-10-19 LAB — HOLD SPECIMEN: NORMAL

## 2024-10-20 LAB
BACTERIA BLD AEROBE CULT: ABNORMAL
BACTERIA BLD CULT: ABNORMAL
BACTERIA BLD CULT: NORMAL
BACTERIA UR CULT: NORMAL
GRAM STN SPEC: ABNORMAL

## 2024-10-22 ENCOUNTER — HOME CARE VISIT (OUTPATIENT)
Dept: HOME HEALTH SERVICES | Facility: HOME HEALTH | Age: 89
End: 2024-10-22
Payer: MEDICARE

## 2024-10-22 LAB
BACTERIA BLD AEROBE CULT: ABNORMAL
BACTERIA BLD CULT: ABNORMAL
GRAM STN SPEC: ABNORMAL

## 2024-10-22 PROCEDURE — G0157 HHC PT ASSISTANT EA 15: HCPCS | Mod: HHH

## 2024-10-23 ENCOUNTER — TELEPHONE (OUTPATIENT)
Dept: PHARMACY | Facility: HOSPITAL | Age: 89
End: 2024-10-23
Payer: MEDICARE

## 2024-10-23 VITALS
HEART RATE: 62 BPM | TEMPERATURE: 97.6 F | OXYGEN SATURATION: 96 % | DIASTOLIC BLOOD PRESSURE: 60 MMHG | SYSTOLIC BLOOD PRESSURE: 115 MMHG

## 2024-10-23 LAB — BACTERIA BLD CULT: NORMAL

## 2024-10-23 SDOH — HEALTH STABILITY: PHYSICAL HEALTH: PHYSICAL EXERCISE: 10

## 2024-10-23 SDOH — HEALTH STABILITY: PHYSICAL HEALTH: EXERCISE TYPE: BLE EXS

## 2024-10-23 SDOH — HEALTH STABILITY: PHYSICAL HEALTH: EXERCISE ACTIVITIES SETS: 1

## 2024-10-23 SDOH — HEALTH STABILITY: PHYSICAL HEALTH: PHYSICAL EXERCISE: STANDING

## 2024-10-23 SDOH — HEALTH STABILITY: PHYSICAL HEALTH: EXERCISE ACTIVITY: APS, LAQS

## 2024-10-23 SDOH — HEALTH STABILITY: PHYSICAL HEALTH: PHYSICAL EXERCISE: SEATED

## 2024-10-23 SDOH — HEALTH STABILITY: PHYSICAL HEALTH: EXERCISE ACTIVITY: MARCHES, HIP ABD, EXT, CALF RAISES, HIP EXT

## 2024-10-23 ASSESSMENT — ACTIVITIES OF DAILY LIVING (ADL)
AMBULATION ASSISTANCE ON FLAT SURFACES: 1
AMBULATION ASSISTANCE: SUPERVISION
PHYSICAL TRANSFERS ASSESSED: 1
CURRENT_FUNCTION: INDEPENDENT
AMBULATION ASSISTANCE: 1

## 2024-10-23 ASSESSMENT — ENCOUNTER SYMPTOMS: DENIES PAIN: 1

## 2024-10-23 NOTE — PROGRESS NOTES
"EDPD Note: Rapid Result Review    I reviewed Garrison Rodriguez \"Jean Claude\" 's chart regarding a positive blood & urine culture/result that was taken during their recent emergency room visit. The patient was not told about these results prior to leaving the emergency department. Therefore, patient was contacted and given proper education.    Patient presented to ED 10/18 experiencing severe hypotension on midodrine in the setting of dehydration. Patient was initiated on cephalexin for assumed cystitis. Blood cultures taken during visit resulted coag(-) staph 1/2 and urine culture came back with no growth. Patient & patient's daughter were counseled to discontinue antibiotic therapy at this time as it is no longer indicated. Patient had no questions at this time.    Susceptibility data from last 90 days.  Collected Specimen Info Organism   10/18/24 Blood culture from Peripheral Venipuncture Staphylococcus haemolyticus     No further follow up needed from EDPD Team.     If there are any other questions for the ED Post-Discharge Culture Follow Up Team, please contact [phone]. Fax: [phone].    Greg Chung, [place]  "

## 2024-10-24 ENCOUNTER — HOME CARE VISIT (OUTPATIENT)
Dept: HOME HEALTH SERVICES | Facility: HOME HEALTH | Age: 89
End: 2024-10-24
Payer: MEDICARE

## 2024-10-24 PROCEDURE — G0157 HHC PT ASSISTANT EA 15: HCPCS | Mod: HHH

## 2024-10-26 VITALS
HEART RATE: 71 BPM | SYSTOLIC BLOOD PRESSURE: 112 MMHG | OXYGEN SATURATION: 96 % | TEMPERATURE: 97.5 F | RESPIRATION RATE: 16 BRPM | DIASTOLIC BLOOD PRESSURE: 50 MMHG

## 2024-10-26 SDOH — HEALTH STABILITY: PHYSICAL HEALTH: EXERCISE ACTIVITIES SETS: 1

## 2024-10-26 SDOH — HEALTH STABILITY: PHYSICAL HEALTH: PHYSICAL EXERCISE: 12

## 2024-10-26 SDOH — HEALTH STABILITY: PHYSICAL HEALTH: PHYSICAL EXERCISE: STANDING

## 2024-10-26 SDOH — HEALTH STABILITY: PHYSICAL HEALTH: EXERCISE TYPE: BLE EXS

## 2024-10-26 SDOH — HEALTH STABILITY: PHYSICAL HEALTH: EXERCISE ACTIVITY: APS, LAQS,

## 2024-10-26 SDOH — HEALTH STABILITY: PHYSICAL HEALTH: PHYSICAL EXERCISE: SEATED

## 2024-10-26 SDOH — HEALTH STABILITY: PHYSICAL HEALTH: PHYSICAL EXERCISE: 15

## 2024-10-26 SDOH — HEALTH STABILITY: PHYSICAL HEALTH: EXERCISE ACTIVITY: MARCHES, CALF RAISES/TOE RAISES, HIP ABD, HIP EXT

## 2024-10-26 ASSESSMENT — ACTIVITIES OF DAILY LIVING (ADL)
PHYSICAL TRANSFERS ASSESSED: 1
AMBULATION ASSISTANCE: SUPERVISION
AMBULATION ASSISTANCE: 1
AMBULATION ASSISTANCE ON FLAT SURFACES: 1
AMBULATION_DISTANCE/DURATION_TOLERATED: 150'
CURRENT_FUNCTION: SUPERVISION

## 2024-10-26 ASSESSMENT — ENCOUNTER SYMPTOMS: DENIES PAIN: 1

## 2024-10-29 ENCOUNTER — LAB (OUTPATIENT)
Dept: LAB | Facility: LAB | Age: 89
End: 2024-10-29
Payer: MEDICARE

## 2024-10-29 ENCOUNTER — APPOINTMENT (OUTPATIENT)
Dept: PRIMARY CARE | Facility: CLINIC | Age: 89
End: 2024-10-29
Payer: MEDICARE

## 2024-10-29 DIAGNOSIS — E83.42 HYPOMAGNESEMIA: ICD-10-CM

## 2024-10-29 DIAGNOSIS — E53.8 VITAMIN B12 DEFICIENCY: ICD-10-CM

## 2024-10-29 DIAGNOSIS — I25.119 ATHEROSCLEROSIS OF NATIVE CORONARY ARTERY OF NATIVE HEART WITH ANGINA PECTORIS: ICD-10-CM

## 2024-10-29 DIAGNOSIS — I95.1 ORTHOSTATIC HYPOTENSION: Primary | ICD-10-CM

## 2024-10-29 LAB — MAGNESIUM SERPL-MCNC: 1.75 MG/DL (ref 1.6–2.4)

## 2024-10-29 PROCEDURE — 83735 ASSAY OF MAGNESIUM: CPT

## 2024-10-29 PROCEDURE — 3078F DIAST BP <80 MM HG: CPT | Performed by: NURSE PRACTITIONER

## 2024-10-29 PROCEDURE — G2211 COMPLEX E/M VISIT ADD ON: HCPCS | Performed by: NURSE PRACTITIONER

## 2024-10-29 PROCEDURE — 82607 VITAMIN B-12: CPT

## 2024-10-29 PROCEDURE — 1157F ADVNC CARE PLAN IN RCRD: CPT | Performed by: NURSE PRACTITIONER

## 2024-10-29 PROCEDURE — 1159F MED LIST DOCD IN RCRD: CPT | Performed by: NURSE PRACTITIONER

## 2024-10-29 PROCEDURE — 99214 OFFICE O/P EST MOD 30 MIN: CPT | Performed by: NURSE PRACTITIONER

## 2024-10-29 PROCEDURE — 3074F SYST BP LT 130 MM HG: CPT | Performed by: NURSE PRACTITIONER

## 2024-10-29 PROCEDURE — 1160F RVW MEDS BY RX/DR IN RCRD: CPT | Performed by: NURSE PRACTITIONER

## 2024-10-29 PROCEDURE — 36415 COLL VENOUS BLD VENIPUNCTURE: CPT

## 2024-10-29 PROCEDURE — 1123F ACP DISCUSS/DSCN MKR DOCD: CPT | Performed by: NURSE PRACTITIONER

## 2024-10-29 PROCEDURE — 1111F DSCHRG MED/CURRENT MED MERGE: CPT | Performed by: NURSE PRACTITIONER

## 2024-10-29 RX ORDER — SODIUM CHLORIDE 1 G/1
1 TABLET ORAL
COMMUNITY
Start: 2024-10-24

## 2024-10-30 ENCOUNTER — HOME CARE VISIT (OUTPATIENT)
Dept: HOME HEALTH SERVICES | Facility: HOME HEALTH | Age: 89
End: 2024-10-30
Payer: MEDICARE

## 2024-10-30 VITALS
WEIGHT: 221 LBS | HEIGHT: 71 IN | SYSTOLIC BLOOD PRESSURE: 92 MMHG | DIASTOLIC BLOOD PRESSURE: 67 MMHG | BODY MASS INDEX: 30.94 KG/M2 | HEART RATE: 60 BPM | OXYGEN SATURATION: 96 %

## 2024-10-30 LAB — VIT B12 SERPL-MCNC: 656 PG/ML (ref 211–911)

## 2024-10-30 PROCEDURE — G0157 HHC PT ASSISTANT EA 15: HCPCS | Mod: HHH

## 2024-10-31 VITALS
HEART RATE: 63 BPM | TEMPERATURE: 97.6 F | DIASTOLIC BLOOD PRESSURE: 60 MMHG | OXYGEN SATURATION: 97 % | RESPIRATION RATE: 16 BRPM | SYSTOLIC BLOOD PRESSURE: 115 MMHG

## 2024-10-31 SDOH — HEALTH STABILITY: PHYSICAL HEALTH: EXERCISE TYPE: BLE EXS

## 2024-10-31 SDOH — HEALTH STABILITY: PHYSICAL HEALTH: PHYSICAL EXERCISE: SEATED

## 2024-10-31 SDOH — HEALTH STABILITY: PHYSICAL HEALTH: EXERCISE ACTIVITIES SETS: 1

## 2024-10-31 SDOH — HEALTH STABILITY: PHYSICAL HEALTH: PHYSICAL EXERCISE: STANDING

## 2024-10-31 SDOH — HEALTH STABILITY: PHYSICAL HEALTH: PHYSICAL EXERCISE: 15

## 2024-10-31 SDOH — HEALTH STABILITY: PHYSICAL HEALTH: EXERCISE ACTIVITY: CALF RAISES, TOE RAISES, MARCHES, HIO ABD/EXT, KNEE FLEX, MINI SQUATS

## 2024-10-31 SDOH — HEALTH STABILITY: PHYSICAL HEALTH: EXERCISE ACTIVITY: APS, LAQS< MARCHES

## 2024-10-31 ASSESSMENT — ACTIVITIES OF DAILY LIVING (ADL)
PHYSICAL TRANSFERS ASSESSED: 1
AMBULATION ASSISTANCE: 1
AMBULATION ASSISTANCE ON FLAT SURFACES: 1
AMBULATION ASSISTANCE: SUPERVISION
AMBULATION_DISTANCE/DURATION_TOLERATED: 300'

## 2024-10-31 ASSESSMENT — ENCOUNTER SYMPTOMS: DENIES PAIN: 1

## 2024-11-01 ENCOUNTER — HOME CARE VISIT (OUTPATIENT)
Dept: HOME HEALTH SERVICES | Facility: HOME HEALTH | Age: 89
End: 2024-11-01
Payer: MEDICARE

## 2024-11-01 PROCEDURE — G0157 HHC PT ASSISTANT EA 15: HCPCS | Mod: HHH

## 2024-11-02 VITALS
HEART RATE: 68 BPM | RESPIRATION RATE: 16 BRPM | OXYGEN SATURATION: 98 % | SYSTOLIC BLOOD PRESSURE: 120 MMHG | TEMPERATURE: 97.9 F | DIASTOLIC BLOOD PRESSURE: 60 MMHG

## 2024-11-02 SDOH — HEALTH STABILITY: PHYSICAL HEALTH: EXERCISE ACTIVITIES SETS: 1

## 2024-11-02 SDOH — HEALTH STABILITY: PHYSICAL HEALTH: EXERCISE COMMENTS: NO KNEE FLEX DUE TO M.CRAMPING

## 2024-11-02 SDOH — HEALTH STABILITY: PHYSICAL HEALTH: PHYSICAL EXERCISE: 15

## 2024-11-02 SDOH — HEALTH STABILITY: PHYSICAL HEALTH: EXERCISE TYPE: BLE EXS

## 2024-11-02 SDOH — HEALTH STABILITY: PHYSICAL HEALTH: PHYSICAL EXERCISE: STANDING

## 2024-11-02 SDOH — HEALTH STABILITY: PHYSICAL HEALTH: EXERCISE ACTIVITY: CALF/TOE RAISES, MARCHES, HIP ABD/EXT, MINI SQUATS, MARCHES

## 2024-11-02 ASSESSMENT — ACTIVITIES OF DAILY LIVING (ADL)
AMBULATION ASSISTANCE ON FLAT SURFACES: 1
PHYSICAL TRANSFERS ASSESSED: 1
AMBULATION ASSISTANCE: 1
CURRENT_FUNCTION: INDEPENDENT
PHYSICAL_TRANSFERS_DEVICES: MI
AMBULATION_DISTANCE/DURATION_TOLERATED: 80'

## 2024-11-02 ASSESSMENT — ENCOUNTER SYMPTOMS: PAIN: 1

## 2024-11-06 ENCOUNTER — HOME CARE VISIT (OUTPATIENT)
Dept: HOME HEALTH SERVICES | Facility: HOME HEALTH | Age: 89
End: 2024-11-06
Payer: MEDICARE

## 2024-11-06 ENCOUNTER — PATIENT OUTREACH (OUTPATIENT)
Dept: PRIMARY CARE | Facility: CLINIC | Age: 89
End: 2024-11-06
Payer: MEDICARE

## 2024-11-06 VITALS — TEMPERATURE: 97.8 F | SYSTOLIC BLOOD PRESSURE: 140 MMHG | DIASTOLIC BLOOD PRESSURE: 96 MMHG

## 2024-11-06 PROCEDURE — G0151 HHCP-SERV OF PT,EA 15 MIN: HCPCS | Mod: HHH

## 2024-11-06 ASSESSMENT — ACTIVITIES OF DAILY LIVING (ADL)
HOME_HEALTH_OASIS: 00
OASIS_M1830: 01

## 2024-11-06 ASSESSMENT — ENCOUNTER SYMPTOMS: DENIES PAIN: 1

## 2024-11-06 NOTE — PROGRESS NOTES
Unable to reach patient for call back after recent hospitalization and follow up with Darleen Phillips CNP.   M with call back number for patient to call if needed.

## 2024-12-09 ENCOUNTER — PATIENT OUTREACH (OUTPATIENT)
Dept: PRIMARY CARE | Facility: CLINIC | Age: 89
End: 2024-12-09
Payer: MEDICARE

## 2024-12-09 NOTE — PROGRESS NOTES
Unable to reach patient or daughter Hetal for monthly post discharge follow up. Previous outreach attempt also failed. LVM with call back number for caregiver to call if needed. TCM program closed. Will reopen if response to message received.

## 2025-01-16 ENCOUNTER — APPOINTMENT (OUTPATIENT)
Dept: NEUROLOGY | Facility: HOSPITAL | Age: OVER 89
End: 2025-01-16
Payer: MEDICARE

## 2025-01-21 ENCOUNTER — APPOINTMENT (OUTPATIENT)
Dept: NEUROLOGY | Facility: HOSPITAL | Age: OVER 89
End: 2025-01-21
Payer: MEDICARE

## 2025-01-29 ENCOUNTER — DOCUMENTATION (OUTPATIENT)
Dept: AUDIOLOGY | Facility: CLINIC | Age: OVER 89
End: 2025-01-29
Payer: MEDICARE

## 2025-01-29 NOTE — PROGRESS NOTES
Patient is deciding what to do about right aid needing repair and has loaners to use :    Right SN: [id number] AND PERSONAL LEFT: [id number]    ALSO  L 90 R RIGHT SN: [id number] AND LEFT: [id number]

## 2025-02-04 ENCOUNTER — CLINICAL SUPPORT (OUTPATIENT)
Dept: AUDIOLOGY | Facility: CLINIC | Age: OVER 89
End: 2025-02-04

## 2025-02-04 PROCEDURE — V5014 HEARING AID REPAIR/MODIFYING: HCPCS | Performed by: AUDIOLOGIST

## 2025-02-04 NOTE — PROGRESS NOTES
AUDIOLOGY ADULT HEARING AID  REPAIR    Name:  Garrison Rodriguez  :  1934  Age:  89 y.o.  Date of Service:  2025    Reason for visit: Mr. Rodriguez's daughter gave right hearing aid to audiologist Rakel Flores to repair as it is producing loud static. Hearing aid will be sent out to [facility] for an out of warranty repair and 6 month warranty. $400 repair fee was paid over the phone.

## 2025-02-10 DIAGNOSIS — F32.1 EPISODE OF MODERATE MAJOR DEPRESSION (MULTI): ICD-10-CM

## 2025-02-10 RX ORDER — ESCITALOPRAM OXALATE 20 MG/1
TABLET ORAL
Qty: 135 TABLET | Refills: 3 | Status: SHIPPED | OUTPATIENT
Start: 2025-02-10

## 2025-03-25 ENCOUNTER — APPOINTMENT (OUTPATIENT)
Dept: PRIMARY CARE | Facility: CLINIC | Age: OVER 89
End: 2025-03-25
Payer: MEDICARE

## 2025-03-26 ENCOUNTER — APPOINTMENT (OUTPATIENT)
Dept: PRIMARY CARE | Facility: CLINIC | Age: OVER 89
End: 2025-03-26
Payer: MEDICARE

## 2025-03-26 VITALS
DIASTOLIC BLOOD PRESSURE: 78 MMHG | OXYGEN SATURATION: 96 % | HEIGHT: 71 IN | WEIGHT: 220.6 LBS | BODY MASS INDEX: 30.88 KG/M2 | SYSTOLIC BLOOD PRESSURE: 115 MMHG | HEART RATE: 64 BPM

## 2025-03-26 DIAGNOSIS — E66.811 CLASS 1 OBESITY DUE TO EXCESS CALORIES WITH SERIOUS COMORBIDITY AND BODY MASS INDEX (BMI) OF 30.0 TO 30.9 IN ADULT: ICD-10-CM

## 2025-03-26 DIAGNOSIS — Z71.89 ADVANCED DIRECTIVES, COUNSELING/DISCUSSION: ICD-10-CM

## 2025-03-26 DIAGNOSIS — I49.5 SICK SINUS SYNDROME (MULTI): ICD-10-CM

## 2025-03-26 DIAGNOSIS — Z00.00 MEDICARE ANNUAL WELLNESS VISIT, SUBSEQUENT: Primary | ICD-10-CM

## 2025-03-26 DIAGNOSIS — F32.1 EPISODE OF MODERATE MAJOR DEPRESSION (MULTI): ICD-10-CM

## 2025-03-26 DIAGNOSIS — E66.09 CLASS 1 OBESITY DUE TO EXCESS CALORIES WITH SERIOUS COMORBIDITY AND BODY MASS INDEX (BMI) OF 30.0 TO 30.9 IN ADULT: ICD-10-CM

## 2025-03-26 DIAGNOSIS — Z00.00 ROUTINE GENERAL MEDICAL EXAMINATION AT HEALTH CARE FACILITY: ICD-10-CM

## 2025-03-26 DIAGNOSIS — M47.816 ARTHRITIS OF LUMBAR SPINE: ICD-10-CM

## 2025-03-26 DIAGNOSIS — Z13.89 SCREENING FOR MULTIPLE CONDITIONS: ICD-10-CM

## 2025-03-26 DIAGNOSIS — I95.1 ORTHOSTATIC HYPOTENSION: ICD-10-CM

## 2025-03-26 PROCEDURE — 3074F SYST BP LT 130 MM HG: CPT | Performed by: NURSE PRACTITIONER

## 2025-03-26 PROCEDURE — 1159F MED LIST DOCD IN RCRD: CPT | Performed by: NURSE PRACTITIONER

## 2025-03-26 PROCEDURE — 1157F ADVNC CARE PLAN IN RCRD: CPT | Performed by: NURSE PRACTITIONER

## 2025-03-26 PROCEDURE — 1158F ADVNC CARE PLAN TLK DOCD: CPT | Performed by: NURSE PRACTITIONER

## 2025-03-26 PROCEDURE — G0439 PPPS, SUBSEQ VISIT: HCPCS | Performed by: NURSE PRACTITIONER

## 2025-03-26 PROCEDURE — 1170F FXNL STATUS ASSESSED: CPT | Performed by: NURSE PRACTITIONER

## 2025-03-26 PROCEDURE — 99497 ADVNCD CARE PLAN 30 MIN: CPT | Performed by: NURSE PRACTITIONER

## 2025-03-26 PROCEDURE — 99397 PER PM REEVAL EST PAT 65+ YR: CPT | Performed by: NURSE PRACTITIONER

## 2025-03-26 PROCEDURE — 1036F TOBACCO NON-USER: CPT | Performed by: NURSE PRACTITIONER

## 2025-03-26 PROCEDURE — 1160F RVW MEDS BY RX/DR IN RCRD: CPT | Performed by: NURSE PRACTITIONER

## 2025-03-26 PROCEDURE — 3078F DIAST BP <80 MM HG: CPT | Performed by: NURSE PRACTITIONER

## 2025-03-26 PROCEDURE — 99214 OFFICE O/P EST MOD 30 MIN: CPT | Performed by: NURSE PRACTITIONER

## 2025-03-26 PROCEDURE — 1123F ACP DISCUSS/DSCN MKR DOCD: CPT | Performed by: NURSE PRACTITIONER

## 2025-03-26 PROCEDURE — G0444 DEPRESSION SCREEN ANNUAL: HCPCS | Performed by: NURSE PRACTITIONER

## 2025-03-26 PROCEDURE — G0447 BEHAVIOR COUNSEL OBESITY 15M: HCPCS | Performed by: NURSE PRACTITIONER

## 2025-03-26 RX ORDER — SERTRALINE HYDROCHLORIDE 25 MG/1
25 TABLET, FILM COATED ORAL DAILY
Qty: 90 TABLET | Refills: 0 | Status: SHIPPED | OUTPATIENT
Start: 2025-03-26 | End: 2025-06-24

## 2025-03-26 ASSESSMENT — PATIENT HEALTH QUESTIONNAIRE - PHQ9
2. FEELING DOWN, DEPRESSED OR HOPELESS: SEVERAL DAYS
1. LITTLE INTEREST OR PLEASURE IN DOING THINGS: NOT AT ALL
SUM OF ALL RESPONSES TO PHQ9 QUESTIONS 1 AND 2: 1
10. IF YOU CHECKED OFF ANY PROBLEMS, HOW DIFFICULT HAVE THESE PROBLEMS MADE IT FOR YOU TO DO YOUR WORK, TAKE CARE OF THINGS AT HOME, OR GET ALONG WITH OTHER PEOPLE: SOMEWHAT DIFFICULT

## 2025-03-26 ASSESSMENT — ACTIVITIES OF DAILY LIVING (ADL)
BATHING: INDEPENDENT
DRESSING: INDEPENDENT
MANAGING_FINANCES: NEEDS ASSISTANCE
TAKING_MEDICATION: NEEDS ASSISTANCE
DOING_HOUSEWORK: INDEPENDENT
GROCERY_SHOPPING: INDEPENDENT

## 2025-03-26 ASSESSMENT — ENCOUNTER SYMPTOMS
DEPRESSION: 0
LOSS OF SENSATION IN FEET: 0
OCCASIONAL FEELINGS OF UNSTEADINESS: 0

## 2025-03-26 NOTE — ASSESSMENT & PLAN NOTE
8/2/21 PT CALLED AND STATES HE RECEIVED LETTER FROM OUR OFFICE  ADVISED PT HE WAS LAST SEEN 4/2021 FOR CSPINE AND RECOMMENDED PHYSICAL THERAPY AND TO SCHEDULE MRI CSPINE AND THEN RETURN  PT WILL CALL TO SCHEDULE MRI AND SET UP THERAPY  AND WILL CALL BACK TO [facility] SO OVL CAN BE SCHEDULED WITH PA TO DISCUSS  Condition stable based on symptoms and exam.    Continue current medications and follow-up at least yearly.  Followed by cardiology

## 2025-03-26 NOTE — PATIENT INSTRUCTIONS
Thank you for seeing me today Garrison Rodriguez, it was a pleasure to see you again!    Today we did your Annual Physical/Medicare Wellness Exam and discussed the following:     Stop lexapro and begin sertraline once daily  Call in 4-5 weeks if any concerns    Continue all medications     Disability placard provided     See cardiology as scheduled     For assistance with scheduling referrals or consultations, please call [phone] or [phone].    For laboratory, radiology, and other tests, please call [phone] ([phone] for pediatrics).   For laboratory locations, please visit [email or web address]   If you do not get results within 7-10 days, or you have any questions or concerns, please send a message, call the office ([phone]), or return to the office for a follow-up appointment.     For acute/sick visits, if you are unable to get an office visit, you can do a  On Demand Virtual Visit that is accessible via your My Chart account.  For emergencies, call 9-1-1 or go to the nearest Emergency Department.     Please schedule additional appointment(s) to address concern(s) not addressed today.    Please review prescription inserts and published information for possible adverse effects of all medications.     In general, results are discussed over the phone or via  [employer].     You can see your health information, review clinical summaries from office visits & test results online when you follow your health with MY  Chart, a personal health record.   To sign up go to [email or web address].   If you need assistance with signing up or trouble getting into your account call [employer] Patient Line 24/7 at [phone]     RTC ANNUALLY AND AS NEEDED     ~Rianna Phillips NP

## 2025-03-26 NOTE — ASSESSMENT & PLAN NOTE
I spent > 16 minutes face to face with Garrison Rodriguez discussing his/her advance directives, including a Living Will, Healthcare POA as well as specific end of life choices and/or directives, and pt was provided with packet to return next visit.    HCPOA: Dgtr   Pt is Full Code

## 2025-03-26 NOTE — ASSESSMENT & PLAN NOTE
- Counseled on healthy diet and regular exercise  - Fall avoidance information provided  - Personalized prevention plan provided   - Vaccines UTD   - FBW UTD

## 2025-03-26 NOTE — ASSESSMENT & PLAN NOTE
Stop lexapro  Begin Sertraline daily   FU 4-5 weeks   Orders:    sertraline (Zoloft) 25 mg tablet; Take 1 tablet (25 mg) by mouth once daily.

## 2025-03-26 NOTE — PROGRESS NOTES
"Subjective   Reason for Visit: Garrison Rodriguez is an 89 y.o. male here for a Medicare Wellness visit.     Past Medical, Surgical, and Family History reviewed and updated in chart.    Reviewed all medications by prescribing practitioner or clinical pharmacist (such as prescriptions, OTCs, herbal therapies and supplements) and documented in the medical record.    HPI  Garrison \"Porfirio" is a 89 yo est male presenting today for AWV/CPE   LOV: 10/29/2024     Dx: HTN, HFpEF, HLD, DEPRESSION, Afib on anticoagulation with pacemaker     Pt is accompanied by his dgtr, Hetal     Pt was last seen in Oct 2024 post hospitalization   Pt was seen by cardiology in their office and was dizzy and hypotensive so they sent him to the ER   Pt was started on midodrine for low BP and NaCl tabs  Family opted against having tilt table test     Dgtr then messaged a few weeks ago, requesting to take pt off of Lexapro as it may be causing dizziness/low BP per neuro   He is down to 10 mg so pt is going to stop and begin Sertraline     Pt is followed by cardiology, Dr. Aguirre  Carotid US completed this year   ECHO done last year     Pt denies any acute c/o  Some days he feels tired so he doesn't do much, other days he goes to the store     No Known Allergies    Patient Care Team:  NICOLE Fernandez-CNP as PCP - General (Family Medicine)  John Moritz, MD as PCP - [employer] PCP       Review of Systems  ROS was completed and all systems are negative with the exception of what was noted in the the HPI.       Objective   Vitals:  /78   Pulse 64   Ht 1.803 m (5' 11\")   Wt 100 kg (220 lb 9.6 oz)   SpO2 96%   BMI 30.77 kg/m²       Past Surgical History:   Procedure Laterality Date    BREAST LUMPECTOMY  07/15/2016    Left Breast Lumpectomy    OTHER SURGICAL HISTORY  05/18/2022    Gallbladder surgery    OTHER SURGICAL HISTORY  05/18/2022    Heart surgery    OTHER SURGICAL HISTORY  05/18/2022    Back surgery    OTHER SURGICAL HISTORY  " 04/15/2020    Cholecystectomy with cholangiography    OTHER SURGICAL HISTORY  04/15/2020    Esophagogastroduodenoscopy    TONSILLECTOMY  06/25/2018    Tonsillectomy    TOTAL KNEE ARTHROPLASTY  01/04/2023    Total Knee Arthroplasty       Current Outpatient Medications   Medication Instructions    acetaminophen (TYLENOL) 500 mg, Every 6 hours PRN    apixaban (Eliquis) 5 mg tablet 1 tablet, 2 times daily    ascorbic acid (VITAMIN C ORAL) 1 capsule, Daily    b complex-vitamin c tablet 1 tablet, Daily    cholecalciferol (VITAMIN D-3) 2,000 Units, Daily    cyanocobalamin, vitamin B-12, 3,000 mcg tablet, sublingual 1 tablet, Daily    loperamide (Imodium A-D) 2 mg tablet 1 tablet, 3 times daily PRN    meclizine (Antivert) 25 mg tablet 1 tablet, Daily PRN    midodrine (PROAMATINE) 10 mg, oral, 3 times daily (morning, midday, late afternoon), Hold for SBP>120    mv-min-folic-K1-lycopen-lutein (Centrum Silver Men) 106--300 mcg tablet Daily    primidone (MYSOLINE) 50 mg, oral, Nightly    rosuvastatin (CRESTOR) 10 mg, Daily    sertraline (ZOLOFT) 25 mg, oral, Daily    sodium chloride 1,000 mg tablet 1 tablet, Every 12 hours scheduled (0630,1830)    ZINC ORAL 1 capsule, Daily         Physical Exam  Vitals reviewed.   Cardiovascular:      Heart sounds: Normal heart sounds.      Comments: Pacemaker   Pulmonary:      Effort: Pulmonary effort is normal.   Musculoskeletal:         General: Normal range of motion.   Skin:     General: Skin is warm and dry.   Neurological:      Mental Status: He is alert and oriented to person, place, and time.   Psychiatric:         Mood and Affect: Mood normal.         Thought Content: Thought content normal.         Judgment: Judgment normal.           Assessment & Plan  Medicare annual wellness visit, subsequent  - Counseled on healthy diet and regular exercise  - Fall avoidance information provided  - Personalized prevention plan provided   - Vaccines UTD   - FBW UTD       Orthostatic  hypotension  BP stable today: 115/78  Continue midodrine and NaCl tabs        Episode of moderate major depression (Multi)  Stop lexapro  Begin Sertraline daily   FU 4-5 weeks   Orders:    sertraline (Zoloft) 25 mg tablet; Take 1 tablet (25 mg) by mouth once daily.    Class 1 obesity due to excess calories with serious comorbidity and body mass index (BMI) of 30.0 to 30.9 in adult  In a face to face session, I informed patient of his/her BMI > 30.  We discussed appropriate nutrition choices and exercise plan to help achieve weight reduction.   Aim for 60 gm of Protein daily  Drink 60 oz of Water daily  Exercise 60 min EVERYDAY          Arthritis of lumbar spine  Disability placard   Orders:    Disability Placard    Sick sinus syndrome (Multi)  Condition stable based on symptoms and exam.    Continue current medications and follow-up at least yearly.  Followed by cardiology        Screening for multiple conditions  Depression screening completed using PHQ-2 questions with results documented in the chart/encounter (15 minutes)  See rooming screening section for documentation and/or progress note for additional information.         Advanced directives, counseling/discussion  I spent > 16 minutes face to face with Garrison Rodriguez discussing his/her advance directives, including a Living Will, Healthcare POA as well as specific end of life choices and/or directives, and pt was provided with packet to return next visit.    HCPOA: Dgtr   Pt is Full Code         Routine general medical examination at health care facility    Orders:    1 Year Follow Up In Primary Care - Wellness Exam; Future      I spent a total of 25 minutes on the date of the service which included preparing to see the patient, face-to-face patient care, completing clinical documentation, obtaining and/or reviewing separately obtained history, performing a medically appropriate examination, counseling and educating the patient/family/caregiver, ordering  medications and/or tests, communicating with other HCPs (not separately reported), communicating results to the patient/family/caregiver and care coordination (not separately reported).     Assessment, impression and plan is reflected in the note above as well as the orders.     Plan was discussed with the patient and patient signalled understanding of the plan.

## 2025-04-30 ENCOUNTER — APPOINTMENT (OUTPATIENT)
Dept: NEUROLOGY | Facility: CLINIC | Age: OVER 89
End: 2025-04-30
Payer: MEDICARE

## 2025-05-29 ENCOUNTER — APPOINTMENT (OUTPATIENT)
Dept: RADIOLOGY | Facility: HOSPITAL | Age: OVER 89
End: 2025-05-29
Payer: MEDICARE

## 2025-05-29 ENCOUNTER — HOSPITAL ENCOUNTER (EMERGENCY)
Facility: HOSPITAL | Age: OVER 89
Discharge: HOME | End: 2025-05-29
Attending: STUDENT IN AN ORGANIZED HEALTH CARE EDUCATION/TRAINING PROGRAM
Payer: MEDICARE

## 2025-05-29 VITALS
SYSTOLIC BLOOD PRESSURE: 125 MMHG | HEIGHT: 72 IN | OXYGEN SATURATION: 95 % | WEIGHT: 210 LBS | HEART RATE: 56 BPM | TEMPERATURE: 97.3 F | DIASTOLIC BLOOD PRESSURE: 78 MMHG | BODY MASS INDEX: 28.44 KG/M2 | RESPIRATION RATE: 15 BRPM

## 2025-05-29 DIAGNOSIS — J06.9 VIRAL UPPER RESPIRATORY TRACT INFECTION: Primary | ICD-10-CM

## 2025-05-29 LAB
FLUAV RNA RESP QL NAA+PROBE: NOT DETECTED
FLUBV RNA RESP QL NAA+PROBE: NOT DETECTED
RSV RNA RESP QL NAA+PROBE: NOT DETECTED
SARS-COV-2 RNA RESP QL NAA+PROBE: NOT DETECTED

## 2025-05-29 PROCEDURE — 2500000005 HC RX 250 GENERAL PHARMACY W/O HCPCS: Performed by: STUDENT IN AN ORGANIZED HEALTH CARE EDUCATION/TRAINING PROGRAM

## 2025-05-29 PROCEDURE — 2500000001 HC RX 250 WO HCPCS SELF ADMINISTERED DRUGS (ALT 637 FOR MEDICARE OP)

## 2025-05-29 PROCEDURE — 71046 X-RAY EXAM CHEST 2 VIEWS: CPT

## 2025-05-29 PROCEDURE — 71046 X-RAY EXAM CHEST 2 VIEWS: CPT | Performed by: RADIOLOGY

## 2025-05-29 PROCEDURE — 2500000001 HC RX 250 WO HCPCS SELF ADMINISTERED DRUGS (ALT 637 FOR MEDICARE OP): Performed by: STUDENT IN AN ORGANIZED HEALTH CARE EDUCATION/TRAINING PROGRAM

## 2025-05-29 PROCEDURE — 99284 EMERGENCY DEPT VISIT MOD MDM: CPT | Mod: 25 | Performed by: STUDENT IN AN ORGANIZED HEALTH CARE EDUCATION/TRAINING PROGRAM

## 2025-05-29 PROCEDURE — 87637 SARSCOV2&INF A&B&RSV AMP PRB: CPT

## 2025-05-29 RX ORDER — DOXYCYCLINE HYCLATE 100 MG
100 TABLET ORAL ONCE
Status: COMPLETED | OUTPATIENT
Start: 2025-05-29 | End: 2025-05-29

## 2025-05-29 RX ORDER — AMOXICILLIN AND CLAVULANATE POTASSIUM 875; 125 MG/1; MG/1
1 TABLET, FILM COATED ORAL EVERY 12 HOURS
Qty: 14 TABLET | Refills: 0 | Status: SHIPPED | OUTPATIENT
Start: 2025-05-29 | End: 2025-06-05

## 2025-05-29 RX ORDER — PSEUDOEPHEDRINE HCL 30 MG
60 TABLET ORAL ONCE
Status: COMPLETED | OUTPATIENT
Start: 2025-05-29 | End: 2025-05-29

## 2025-05-29 RX ORDER — ERYTHROMYCIN 5 MG/G
1 OINTMENT OPHTHALMIC ONCE
Status: COMPLETED | OUTPATIENT
Start: 2025-05-29 | End: 2025-05-29

## 2025-05-29 RX ORDER — AMOXICILLIN AND CLAVULANATE POTASSIUM 875; 125 MG/1; MG/1
1 TABLET, FILM COATED ORAL ONCE
Status: COMPLETED | OUTPATIENT
Start: 2025-05-29 | End: 2025-05-29

## 2025-05-29 RX ORDER — ERYTHROMYCIN 5 MG/G
1 OINTMENT OPHTHALMIC 4 TIMES DAILY
Qty: 3.5 G | Refills: 0 | Status: SHIPPED | OUTPATIENT
Start: 2025-05-29

## 2025-05-29 RX ORDER — DOXYCYCLINE 100 MG/1
100 TABLET ORAL 2 TIMES DAILY
Qty: 14 TABLET | Refills: 0 | Status: SHIPPED | OUTPATIENT
Start: 2025-05-29 | End: 2025-06-05

## 2025-05-29 RX ORDER — ACETAMINOPHEN 325 MG/1
975 TABLET ORAL ONCE
Status: COMPLETED | OUTPATIENT
Start: 2025-05-29 | End: 2025-05-29

## 2025-05-29 RX ADMIN — ERYTHROMYCIN 1 CM: 5 OINTMENT OPHTHALMIC at 21:22

## 2025-05-29 RX ADMIN — DOXYCYCLINE HYCLATE 100 MG: 100 TABLET, COATED ORAL at 21:22

## 2025-05-29 RX ADMIN — ACETAMINOPHEN 975 MG: 325 TABLET, FILM COATED ORAL at 18:49

## 2025-05-29 RX ADMIN — PSEUDOEPHEDRINE HCL 60 MG: 30 TABLET, FILM COATED ORAL at 18:50

## 2025-05-29 RX ADMIN — AMOXICILLIN AND CLAVULANATE POTASSIUM 1 TABLET: 875; 125 TABLET, FILM COATED ORAL at 21:22

## 2025-05-29 ASSESSMENT — PAIN SCALES - GENERAL
PAINLEVEL_OUTOF10: 0 - NO PAIN

## 2025-05-29 ASSESSMENT — COLUMBIA-SUICIDE SEVERITY RATING SCALE - C-SSRS
2. HAVE YOU ACTUALLY HAD ANY THOUGHTS OF KILLING YOURSELF?: NO
1. IN THE PAST MONTH, HAVE YOU WISHED YOU WERE DEAD OR WISHED YOU COULD GO TO SLEEP AND NOT WAKE UP?: NO
6. HAVE YOU EVER DONE ANYTHING, STARTED TO DO ANYTHING, OR PREPARED TO DO ANYTHING TO END YOUR LIFE?: NO

## 2025-05-29 ASSESSMENT — PAIN - FUNCTIONAL ASSESSMENT: PAIN_FUNCTIONAL_ASSESSMENT: 0-10

## 2025-05-29 ASSESSMENT — LIFESTYLE VARIABLES
HAVE PEOPLE ANNOYED YOU BY CRITICIZING YOUR DRINKING: NO
EVER HAD A DRINK FIRST THING IN THE MORNING TO STEADY YOUR NERVES TO GET RID OF A HANGOVER: NO
HAVE YOU EVER FELT YOU SHOULD CUT DOWN ON YOUR DRINKING: NO
TOTAL SCORE: 0
EVER FELT BAD OR GUILTY ABOUT YOUR DRINKING: NO

## 2025-05-29 NOTE — ED PROVIDER NOTES
HPI   Chief Complaint   Patient presents with    URI    Fatigue       Patient is a 89-year-old male with past medical history of orthostatic hypotension on midodrine, atrial fibrillation on anticoagulation, MDD, obesity, arthritis, sick sinus syndrome, atherosclerosis presenting with concern for cough, congestion, fatigue for 5 days.  Reports grandchildren sick with similar symptoms and he has seen them recently.  Denies chest pain, shortness of breath, fevers.  Does report some ear irritation.  Daughter predominately concerned with how tired he has been in the last several days.            Patient History   Medical History[1]  Surgical History[2]  Family History[3]  Social History[4]    Physical Exam   ED Triage Vitals [05/29/25 1821]   Temperature Heart Rate Respirations BP   36.3 °C (97.3 °F) 73 18 108/65      Pulse Ox Temp Source Heart Rate Source Patient Position   96 % Oral Monitor Sitting      BP Location FiO2 (%)     Right arm --       Physical Exam  Constitutional:       Appearance: Normal appearance.   HENT:      Head: Normocephalic and atraumatic.      Ears:      Comments: Right tympanic membrane easily position, pearly gray in color, no effusion.  Left tympanic membrane only partly visualized due to cerumen impaction but no clear erythema, bulge or effusion.     Nose:      Comments: Bilateral rhinorrhea.     Mouth/Throat:      Comments: Uvula midline, no tonsillar swelling or exudate.  Eyes:      Extraocular Movements: Extraocular movements intact.   Cardiovascular:      Rate and Rhythm: Normal rate.   Pulmonary:      Effort: Pulmonary effort is normal.   Musculoskeletal:         General: Normal range of motion.      Cervical back: Normal range of motion.   Skin:     General: Skin is warm and dry.   Neurological:      General: No focal deficit present.      Mental Status: He is alert and oriented to person, place, and time.   Psychiatric:         Mood and Affect: Mood normal.         Behavior: Behavior  normal.           ED Course & MDM   Diagnoses as of 05/29/25 2055   Viral upper respiratory tract infection                 No data recorded     [place] Coma Scale Score: 15 (05/29/25 1824 : Michelle Echeverria RN)                           Medical Decision Making  Patient is a 89-year-old male with past medical history of orthostatic hypotension on midodrine, atrial fibrillation on anticoagulation, MDD, obesity, arthritis, sick sinus syndrome, atherosclerosis presenting with concern for cough, congestion, fatigue for 5 days.  Constellation of symptoms, known sick contacts with viral syndrome most consistent with viral syndrome and patient otherwise well-appearing for age, vitals reassuring.  Low pretest probability for pneumonia other than family concern and chest x-ray with no evidence of focal consolidation.  Viral testing obtained and shared decision making that was negative for flu, COVID, RSV although suspect other virus still most likely etiology.  Patient otherwise with development of mild left eye conjunctivitis while in the ED.  Suspect viral conjunctivitis given otherwise given constellation of symptoms and wife endorse already has erythromycin to try if develop symptoms more concerning for bacterial etiology.  In shared decision making during attending evaluation, decision made to empirically prescribe antibiotics given sputum color, patient's age and comorbidities.  Benefits of treatment felt to outweigh risk.  Advised on return precautions, and home symptomatic management and discharged home.    Patient seen and discussed with Dr. 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    Post-herpetic polyneuropathy    Spondylosis, unspecified 10/12/2016    Degenerative arthritis of spine   [2]   Past Surgical History:  Procedure Laterality Date    BREAST LUMPECTOMY  07/15/2016    Left Breast Lumpectomy    OTHER SURGICAL HISTORY  05/18/2022    Gallbladder surgery    OTHER SURGICAL HISTORY  05/18/2022    Heart surgery    OTHER SURGICAL HISTORY  05/18/2022    Back surgery    OTHER SURGICAL HISTORY  04/15/2020    Cholecystectomy with cholangiography    OTHER SURGICAL HISTORY  04/15/2020    Esophagogastroduodenoscopy    TONSILLECTOMY  06/25/2018    Tonsillectomy    TOTAL KNEE ARTHROPLASTY  01/04/2023    Total Knee Arthroplasty   [3] No family history on file.  [4]   Social History  Tobacco Use    Smoking status: Never     Passive exposure: Never    Smokeless tobacco: Never   Vaping Use    Vaping status: Never Used   Substance Use Topics    Alcohol use: Never    Drug use: Never        Gadiel Ortega MD  Resident  05/29/25 9232

## 2025-05-30 NOTE — DISCHARGE INSTRUCTIONS
Return to the emergency department if you develop new fevers, have significant worsening of symptoms, or for any other acute concerns. Return to the emergency department if you have significant worsening of symptoms or for any other acute concerns.

## 2025-06-05 ENCOUNTER — APPOINTMENT (OUTPATIENT)
Dept: PRIMARY CARE | Facility: CLINIC | Age: OVER 89
End: 2025-06-05
Payer: MEDICARE

## 2025-06-05 VITALS
BODY MASS INDEX: 29.53 KG/M2 | HEART RATE: 68 BPM | OXYGEN SATURATION: 93 % | HEIGHT: 72 IN | WEIGHT: 218 LBS | SYSTOLIC BLOOD PRESSURE: 110 MMHG | DIASTOLIC BLOOD PRESSURE: 72 MMHG

## 2025-06-05 DIAGNOSIS — I11.0 HYPERTENSIVE HEART DISEASE WITH HEART FAILURE: ICD-10-CM

## 2025-06-05 DIAGNOSIS — G25.0 ESSENTIAL TREMOR: ICD-10-CM

## 2025-06-05 DIAGNOSIS — Z09 FOLLOW-UP EXAMINATION: Primary | ICD-10-CM

## 2025-06-05 DIAGNOSIS — I50.32 CHRONIC DIASTOLIC CONGESTIVE HEART FAILURE: ICD-10-CM

## 2025-06-05 PROCEDURE — 99213 OFFICE O/P EST LOW 20 MIN: CPT | Performed by: NURSE PRACTITIONER

## 2025-06-05 PROCEDURE — 1159F MED LIST DOCD IN RCRD: CPT | Performed by: NURSE PRACTITIONER

## 2025-06-05 PROCEDURE — 1160F RVW MEDS BY RX/DR IN RCRD: CPT | Performed by: NURSE PRACTITIONER

## 2025-06-05 PROCEDURE — 3078F DIAST BP <80 MM HG: CPT | Performed by: NURSE PRACTITIONER

## 2025-06-05 PROCEDURE — 1036F TOBACCO NON-USER: CPT | Performed by: NURSE PRACTITIONER

## 2025-06-05 PROCEDURE — 3074F SYST BP LT 130 MM HG: CPT | Performed by: NURSE PRACTITIONER

## 2025-06-05 RX ORDER — PRIMIDONE 50 MG/1
50 TABLET ORAL NIGHTLY
Qty: 90 TABLET | Refills: 3 | Status: SHIPPED | OUTPATIENT
Start: 2025-06-05 | End: 2026-06-05

## 2025-06-05 NOTE — ASSESSMENT & PLAN NOTE
Rx Primidone daily    Orders:    primidone (Mysoline) 50 mg tablet; Take 1 tablet (50 mg) by mouth once daily at bedtime.

## 2025-06-05 NOTE — PROGRESS NOTES
"Subjective   Chief Complaint   Patient presents with    Follow-up     Garrison Rodriguez is a 89 y.o. male is here today for a follow up for the ER on 5/29/25. Patient states he had a cough, chest congestion and fatigue. This went on for a week. He was put on antibiotics. Patient feeling much better. Slight nasal congestion.       Patient ID: Garrison Rodriguez \"Porfirio" is a 89 y.o. male who presents for Follow-up (Garrison Rodriguez is a 89 y.o. male is here today for a follow up for the ER on 5/29/25. Patient states he had a cough, chest congestion and fatigue. This went on for a week. He was put on antibiotics. Patient feeling much better. Slight nasal congestion.).    HPI  Garrison \"Jean Claude\" is a very pleasant 89 yo male presenting today for ER follow up   LOV: 3/26/2025    Dx: Orthostatic Hypotension, Afib w/OAC, MDD, OBESITY, OA, CAD, SICK SINUS SYNDROME     Pt went to [facility] ER on 5/29/25 for c/o cough, congestion and fatigue x 5 days PTA  Pt states he was around grand kids that had been \"sick\"   Daughter was with pt in the ER and was mostly concerned about pts fatigue   CXR--> ? Early pneumonia, so pt was tx'd with Augmentin and Doxycycline and Erythromycin ointment for conjunctivitis    Today pt states he feels so much better  He denies any CP/SOB/VIDALES   Has intermittent dry cough  No fevers/chills  Not sleeping all day     Dgtr is asking for refill on Primidone and if pt could be prescribed a medication for his memory  I have reviewed the benefits vs risks of polypharmacy, specifically with pts history of orthostatic hypotension and both the dgtr, Hetal and Mr. Rodriguez agreed to not begin new medication at this time.    RX Allergies[1]    Review of Systems  ROS was completed and all systems are negative with the exception of what was noted in the the HPI.       Objective     Last Recorded Vitals   /72   Pulse 68   Ht 1.829 m (6')   Wt 98.9 kg (218 lb)   SpO2 93%   BMI 29.57 kg/m²      Medications  Current Outpatient Medications "   Medication Instructions    acetaminophen (TYLENOL) 500 mg, Every 6 hours PRN    amoxicillin-clavulanate (Augmentin) 875-125 mg tablet 1 tablet, oral, Every 12 hours    apixaban (Eliquis) 5 mg tablet 1 tablet, 2 times daily    ascorbic acid (VITAMIN C ORAL) 1 capsule, Daily    b complex-vitamin c tablet 1 tablet, Daily    cholecalciferol (VITAMIN D-3) 2,000 Units, Daily    cyanocobalamin, vitamin B-12, 3,000 mcg tablet, sublingual 1 tablet, Daily    doxycycline (ADOXA) 100 mg, oral, 2 times daily, Take with a full glass of water and do not lie down for at least 30 minutes after    erythromycin (Romycin) 5 mg/gram (0.5 %) ophthalmic ointment 1 Application, Left Eye, 4 times daily, Apply Amount per Dose: 0.5 inch (~1 cm) per dose.    loperamide (Imodium A-D) 2 mg tablet 1 tablet, 3 times daily PRN    meclizine (Antivert) 25 mg tablet 1 tablet, Daily PRN    midodrine (PROAMATINE) 10 mg, oral, 3 times daily (morning, midday, late afternoon), Hold for SBP>120    mv-min-folic-K1-lycopen-lutein (Centrum Silver Men) 500--300 mcg tablet Daily    primidone (MYSOLINE) 50 mg, oral, Nightly    rosuvastatin (CRESTOR) 10 mg, Daily    sertraline (ZOLOFT) 25 mg, oral, Daily    sodium chloride 1,000 mg tablet 1 tablet, Every 12 hours scheduled (0630,1830)    ZINC ORAL 1 capsule, Daily       Surgical History[2]      Physical Exam  Pulmonary:      Effort: Pulmonary effort is normal.      Breath sounds: Normal breath sounds.       Assessment & Plan  Follow-up examination  Continue medications as prescribed  No further orders        Essential tremor  Rx Primidone daily    Orders:    primidone (Mysoline) 50 mg tablet; Take 1 tablet (50 mg) by mouth once daily at bedtime.    Hypertensive heart disease with heart failure  Condition stable based on symptoms and exam.    Continue current medications and follow-up at least yearly.         Chronic diastolic congestive heart failure  Condition stable based on symptoms and exam.     Continue current medications and follow-up at least yearly.                  [1] No Known Allergies  [2]   Past Surgical History:  Procedure Laterality Date    BREAST LUMPECTOMY  07/15/2016    Left Breast Lumpectomy    OTHER SURGICAL HISTORY  05/18/2022    Gallbladder surgery    OTHER SURGICAL HISTORY  05/18/2022    Heart surgery    OTHER SURGICAL HISTORY  05/18/2022    Back surgery    OTHER SURGICAL HISTORY  04/15/2020    Cholecystectomy with cholangiography    OTHER SURGICAL HISTORY  04/15/2020    Esophagogastroduodenoscopy    TONSILLECTOMY  06/25/2018    Tonsillectomy    TOTAL KNEE ARTHROPLASTY  01/04/2023    Total Knee Arthroplasty

## 2025-06-05 NOTE — PATIENT INSTRUCTIONS
Follow-up examination  Continue medications as prescribed  No further orders        Essential tremor  Rx Primidone daily    Orders:    primidone (Mysoline) 50 mg tablet; Take 1 tablet (50 mg) by mouth once daily at bedtime.    RTC AS SCHEDULED

## 2025-06-20 ENCOUNTER — PATIENT MESSAGE (OUTPATIENT)
Dept: PRIMARY CARE | Facility: CLINIC | Age: OVER 89
End: 2025-06-20
Payer: MEDICARE

## 2025-06-20 DIAGNOSIS — F32.1 EPISODE OF MODERATE MAJOR DEPRESSION (MULTI): ICD-10-CM

## 2025-06-23 RX ORDER — SERTRALINE HYDROCHLORIDE 25 MG/1
25 TABLET, FILM COATED ORAL DAILY
Qty: 90 TABLET | Refills: 3 | Status: SHIPPED | OUTPATIENT
Start: 2025-06-23

## 2025-06-26 ENCOUNTER — APPOINTMENT (OUTPATIENT)
Dept: NEUROLOGY | Facility: HOSPITAL | Age: OVER 89
End: 2025-06-26
Payer: MEDICARE

## 2025-08-28 ENCOUNTER — APPOINTMENT (OUTPATIENT)
Dept: PRIMARY CARE | Facility: CLINIC | Age: OVER 89
End: 2025-08-28
Payer: MEDICARE

## 2025-09-10 ENCOUNTER — APPOINTMENT (OUTPATIENT)
Dept: PRIMARY CARE | Facility: CLINIC | Age: OVER 89
End: 2025-09-10
Payer: MEDICARE

## 2026-03-25 ENCOUNTER — APPOINTMENT (OUTPATIENT)
Dept: PRIMARY CARE | Facility: CLINIC | Age: OVER 89
End: 2026-03-25
Payer: MEDICARE